# Patient Record
Sex: FEMALE | Race: WHITE | NOT HISPANIC OR LATINO | Employment: FULL TIME | URBAN - NONMETROPOLITAN AREA
[De-identification: names, ages, dates, MRNs, and addresses within clinical notes are randomized per-mention and may not be internally consistent; named-entity substitution may affect disease eponyms.]

---

## 2017-01-13 ENCOUNTER — OFFICE VISIT (OUTPATIENT)
Dept: FAMILY MEDICINE CLINIC | Facility: CLINIC | Age: 27
End: 2017-01-13

## 2017-01-13 VITALS
WEIGHT: 156 LBS | TEMPERATURE: 99.3 F | SYSTOLIC BLOOD PRESSURE: 126 MMHG | HEART RATE: 88 BPM | DIASTOLIC BLOOD PRESSURE: 78 MMHG | BODY MASS INDEX: 25.96 KG/M2 | OXYGEN SATURATION: 95 % | RESPIRATION RATE: 16 BRPM

## 2017-01-13 DIAGNOSIS — J06.0 ACUTE LARYNGOPHARYNGITIS: ICD-10-CM

## 2017-01-13 DIAGNOSIS — J06.0 ACUTE LARYNGOPHARYNGITIS: Primary | ICD-10-CM

## 2017-01-13 LAB
EXPIRATION DATE: NORMAL
INTERNAL CONTROL: NORMAL
Lab: NORMAL
S PYO AG THROAT QL: NEGATIVE

## 2017-01-13 PROCEDURE — 87880 STREP A ASSAY W/OPTIC: CPT | Performed by: INTERNAL MEDICINE

## 2017-01-13 PROCEDURE — 99213 OFFICE O/P EST LOW 20 MIN: CPT | Performed by: INTERNAL MEDICINE

## 2017-01-13 RX ORDER — AZITHROMYCIN 250 MG/1
TABLET, FILM COATED ORAL
Qty: 6 TABLET | Refills: 0 | Status: SHIPPED | OUTPATIENT
Start: 2017-01-13 | End: 2017-01-13 | Stop reason: SDUPTHER

## 2017-01-13 RX ORDER — AZITHROMYCIN 250 MG/1
TABLET, FILM COATED ORAL
Qty: 6 TABLET | Refills: 0 | Status: SHIPPED | OUTPATIENT
Start: 2017-01-13 | End: 2017-05-17

## 2017-01-13 NOTE — MR AVS SNAPSHOT
Lindsay Jeanineherminia Francis   1/13/2017 10:00 AM   Office Visit    Dept Phone:  [phone]   Encounter #:  [id number]    Provider:  London Camarena DO   Department:  [facility] PRIMARY CARE                Your Full Care Plan              Where to Get Your Medications      You can get these medications from any pharmacy     Bring a paper prescription for each of these medications     azithromycin 250 MG tablet            Your Updated Medication List          This list is accurate as of: 1/13/17 10:58 AM.  Always use your most recent med list.                azithromycin 250 MG tablet   Commonly known as:  ZITHROMAX Z-DANNY   Take 2 tablets the first day, then 1 tablet daily for 4 days.       ciprofloxacin 500 MG tablet   Commonly known as:  CIPRO   Take 1 tablet by mouth 2 (Two) Times a Day.       vitamin B-12 1000 MCG tablet   Commonly known as:  CYANOCOBALAMIN               We Performed the Following     POC Rapid Strep A       You Were Diagnosed With        Codes Comments    Acute laryngopharyngitis    -  Primary ICD-10-CM: J06.0  ICD-9-CM: 465.0       Instructions    Pharyngitis  Pharyngitis is redness, pain, and swelling (inflammation) of your pharynx.   CAUSES   Pharyngitis is usually caused by infection. Most of the time, these infections are from viruses (viral) and are part of a cold. However, sometimes pharyngitis is caused by bacteria (bacterial). Pharyngitis can also be caused by allergies. Viral pharyngitis may be spread from person to person by coughing, sneezing, and personal items or utensils (cups, forks, spoons, toothbrushes). Bacterial pharyngitis may be spread from person to person by more intimate contact, such as kissing.   SIGNS AND SYMPTOMS   Symptoms of pharyngitis include:    · Sore throat.    · Tiredness (fatigue).    · Low-grade fever.    · Headache.  · Joint pain and muscle aches.  · Skin rashes.  · Swollen lymph nodes.  · Plaque-like film on throat  or tonsils (often seen with bacterial pharyngitis).  DIAGNOSIS   Your health care provider will ask you questions about your illness and your symptoms. Your medical history, along with a physical exam, is often all that is needed to diagnose pharyngitis. Sometimes, a rapid strep test is done. Other lab tests may also be done, depending on the suspected cause.   TREATMENT   Viral pharyngitis will usually get better in 3-4 days without the use of medicine. Bacterial pharyngitis is treated with medicines that kill germs (antibiotics).   HOME CARE INSTRUCTIONS   · Drink enough water and fluids to keep your urine clear or pale yellow.    · Only take over-the-counter or prescription medicines as directed by your health care provider:      If you are prescribed antibiotics, make sure you finish them even if you start to feel better.      Do not take aspirin.    · Get lots of rest.    · Gargle with 8 oz of salt water (½ tsp of salt per 1 qt of water) as often as every 1-2 hours to soothe your throat.    · Throat lozenges (if you are not at risk for choking) or sprays may be used to soothe your throat.  SEEK MEDICAL CARE IF:   · You have large, tender lumps in your neck.  · You have a rash.  · You cough up green, yellow-brown, or bloody spit.  SEEK IMMEDIATE MEDICAL CARE IF:   · Your neck becomes stiff.  · You drool or are unable to swallow liquids.  · You vomit or are unable to keep medicines or liquids down.  · You have severe pain that does not go away with the use of recommended medicines.  · You have trouble breathing (not caused by a stuffy nose).  MAKE SURE YOU:   · Understand these instructions.  · Will watch your condition.  · Will get help right away if you are not doing well or get worse.     This information is not intended to replace advice given to you by your health care provider. Make sure you discuss any questions you have with your health care provider.     Document Released: 12/18/2006 Document Revised:  10/08/2014 Document Reviewed: 2014  [employer] Interactive Patient Education © [employer]       Patient Instructions History      Upcoming Appointments     Visit Type Date Time Department    OFFICE VISIT 2017 10:00 AM [facility]ENNIFER      [employer] Signup     [facility] [employer] allows you to send messages to your doctor, view your test results, renew your prescriptions, schedule appointments, and more. To sign up, go to [employer] and click on the Sign Up Now link in the New User? box. Enter your [employer] Activation Code exactly as it appears below along with the last four digits of your Social Security Number and your Date of Birth () to complete the sign-up process. If you do not sign up before the expiration date, you must request a new code.    [employer] Activation Code: [email or web address]  Expires: 2017 10:57 AM    If you have questions, you can email [email or web address] or call [phone] to talk to our [employer] staff. Remember, [employer] is NOT to be used for urgent needs. For medical emergencies, dial 911.               Other Info from Your Visit           Allergies     No Known Allergies      Reason for Visit     Sore Throat     Fever     Fatigue           Vital Signs     Blood Pressure Pulse Temperature Respirations Weight Oxygen Saturation    126/78 88 99.3 °F (37.4 °C) (Oral) 16 156 lb (70.8 kg) 95%    Body Mass Index Smoking Status                25.96 kg/m2 Never Smoker          Problems and Diagnoses Noted     Acute laryngopharyngitis    -  Primary      Results     POC Rapid Strep A      Component Value Standard Range & Units    Rapid Strep A Screen Negative Negative, VALID, INVALID, Not Performed    Internal Control Passed Passed    Lot Number [id number]     Expiration Date 2018

## 2017-01-13 NOTE — PATIENT INSTRUCTIONS

## 2017-01-14 NOTE — PROGRESS NOTES
Chief Complaint   Patient presents with   • Sore Throat   • Fever   • Fatigue       Subjective     History of Present Illness   Lindsay Francis is a 26 y.o. female presenting with 3 days progressively worsening URI symptoms including sore throat, fever, fatigue, and cough.  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  She is concerned as symptoms are progressively worsening.    The following portions of the patient's history were reviewed and updated as appropriate: allergies, current medications, past family history, past medical history, past social history, past surgical history and problem list.    Review of Systems   Constitutional: Negative for chills, fatigue and fever.   HENT: Positive for congestion, rhinorrhea and sinus pressure. Negative for ear pain and sore throat.    Eyes: Negative for visual disturbance.   Respiratory: Positive for cough. Negative for chest tightness, shortness of breath and wheezing.    Cardiovascular: Negative for chest pain, palpitations and leg swelling.   Gastrointestinal: Negative for abdominal pain, blood in stool, constipation, diarrhea, nausea and vomiting.   Endocrine: Negative for polydipsia and polyuria.   Genitourinary: Negative for dysuria and hematuria.   Musculoskeletal: Negative for back pain.   Skin: Negative for rash.   Neurological: Negative for dizziness, light-headedness, numbness and headaches.   Psychiatric/Behavioral: Negative for dysphoric mood and sleep disturbance. The patient is not nervous/anxious.        No Known Allergies    Past Medical History   Diagnosis Date   • Finger fracture    • GERD (gastroesophageal reflux disease)    • Kidney infection        Social History     Social History   • Marital status: Legally      Spouse name: N/A   • Number of children: N/A   • Years of education: N/A     Occupational History   •  [employer]     Social History Main Topics   • Smoking status: Never Smoker   • Smokeless tobacco: Not on  file   • Alcohol use No   • Drug use: No   • Sexual activity: Not on file     Other Topics Concern   • Not on file     Social History Narrative       No past surgical history on file.    Family History   Problem Relation Age of Onset   • Arthritis Mother    • Arthritis Father    • Arthritis Maternal Grandmother          Current Outpatient Prescriptions:   •  vitamin B-12 (CYANOCOBALAMIN) 1000 MCG tablet, Take 1,000 mcg by mouth daily., Disp: , Rfl:   •  azithromycin (ZITHROMAX Z-DANNY) 250 MG tablet, Take 2 tablets the first day, then 1 tablet daily for 4 days., Disp: 6 tablet, Rfl: 0  •  ciprofloxacin (CIPRO) 500 MG tablet, Take 1 tablet by mouth 2 (Two) Times a Day., Disp: 20 tablet, Rfl: 0    Objective   Visit Vitals   • /78   • Pulse 88   • Temp 99.3 °F (37.4 °C) (Oral)   • Resp 16   • Wt 156 lb (70.8 kg)   • SpO2 95%   • BMI 25.96 kg/m2       Physical Exam   Constitutional: She is oriented to person, place, and time. She appears well-developed and well-nourished.   HENT:   Head: Normocephalic and atraumatic.   Right Ear: External ear normal.   Left Ear: External ear normal.   Mild throat erythema   Eyes: Conjunctivae are normal.   Pulmonary/Chest: Effort normal.   Musculoskeletal: Normal range of motion.   Neurological: She is alert and oriented to person, place, and time.   Psychiatric: She has a normal mood and affect. Her behavior is normal.   Nursing note and vitals reviewed.      Assessment/Plan   Lindsay was seen today for sore throat, fever and fatigue.    Diagnoses and all orders for this visit:    Acute laryngopharyngitis  -     POC Rapid Strep A  -     Discontinue: azithromycin (ZITHROMAX Z-DANNY) 250 MG tablet; Take 2 tablets the first day, then 1 tablet daily for 4 days.  -     Discontinue: azithromycin (ZITHROMAX Z-DANNY) 250 MG tablet; Take 2 tablets the first day, then 1 tablet daily for 4 days.      Discussion Summary:  25 yo W F presenting with laryngopharyngitis, started tx with  Zpak.      Follow up:  Return if symptoms worsen or fail to improve.     Patient Instructions:  Patient instructions were provided.

## 2017-05-17 ENCOUNTER — OFFICE VISIT (OUTPATIENT)
Dept: FAMILY MEDICINE CLINIC | Facility: CLINIC | Age: 27
End: 2017-05-17

## 2017-05-17 ENCOUNTER — APPOINTMENT (OUTPATIENT)
Dept: LAB | Facility: HOSPITAL | Age: 27
End: 2017-05-17

## 2017-05-17 VITALS
BODY MASS INDEX: 27.79 KG/M2 | WEIGHT: 167 LBS | SYSTOLIC BLOOD PRESSURE: 124 MMHG | HEART RATE: 88 BPM | DIASTOLIC BLOOD PRESSURE: 83 MMHG | OXYGEN SATURATION: 100 % | RESPIRATION RATE: 16 BRPM | TEMPERATURE: 98.4 F

## 2017-05-17 DIAGNOSIS — L04.9 LYMPHADENITIS, ACUTE: ICD-10-CM

## 2017-05-17 DIAGNOSIS — E53.8 VITAMIN B12 DEFICIENCY: ICD-10-CM

## 2017-05-17 DIAGNOSIS — R53.83 MALAISE AND FATIGUE: Primary | ICD-10-CM

## 2017-05-17 DIAGNOSIS — R53.81 MALAISE AND FATIGUE: Primary | ICD-10-CM

## 2017-05-17 DIAGNOSIS — E55.9 VITAMIN D DEFICIENCY: ICD-10-CM

## 2017-05-17 DIAGNOSIS — R63.5 WEIGHT GAIN: ICD-10-CM

## 2017-05-17 LAB
25(OH)D3 SERPL-MCNC: 37.5 NG/ML
ALBUMIN SERPL-MCNC: 4.6 G/DL (ref 3.5–5)
ALBUMIN/GLOB SERPL: 1.5 G/DL (ref 1–2)
ALP SERPL-CCNC: 57 U/L (ref 38–126)
ALT SERPL W P-5'-P-CCNC: 29 U/L (ref 13–69)
ANION GAP SERPL CALCULATED.3IONS-SCNC: 12.9 MMOL/L
AST SERPL-CCNC: 25 U/L (ref 15–46)
BASOPHILS # BLD AUTO: 0.02 10*3/MM3 (ref 0–0.2)
BASOPHILS NFR BLD AUTO: 0.3 % (ref 0–2.5)
BILIRUB SERPL-MCNC: 0.4 MG/DL (ref 0.2–1.3)
BUN BLD-MCNC: 10 MG/DL (ref 7–20)
BUN/CREAT SERPL: 16.7 (ref 7.1–23.5)
CALCIUM SPEC-SCNC: 9.4 MG/DL (ref 8.4–10.2)
CHLORIDE SERPL-SCNC: 104 MMOL/L (ref 98–107)
CO2 SERPL-SCNC: 28 MMOL/L (ref 26–30)
CREAT BLD-MCNC: 0.6 MG/DL (ref 0.6–1.3)
DEPRECATED RDW RBC AUTO: 39.5 FL (ref 37–54)
EOSINOPHIL # BLD AUTO: 0.11 10*3/MM3 (ref 0–0.7)
EOSINOPHIL NFR BLD AUTO: 1.8 % (ref 0–7)
ERYTHROCYTE [DISTWIDTH] IN BLOOD BY AUTOMATED COUNT: 12 % (ref 11.5–14.5)
GFR SERPL CREATININE-BSD FRML MDRD: 122 ML/MIN/1.73
GLOBULIN UR ELPH-MCNC: 3 GM/DL
GLUCOSE BLD-MCNC: 92 MG/DL (ref 74–98)
HBA1C MFR BLD: 5 % (ref 3–6)
HCT VFR BLD AUTO: 40.7 % (ref 37–47)
HGB BLD-MCNC: 13.6 G/DL (ref 12–16)
IMM GRANULOCYTES # BLD: 0.02 10*3/MM3 (ref 0–0.06)
IMM GRANULOCYTES NFR BLD: 0.3 % (ref 0–0.6)
LYMPHOCYTES # BLD AUTO: 1.33 10*3/MM3 (ref 0.6–3.4)
LYMPHOCYTES NFR BLD AUTO: 21.6 % (ref 10–50)
MCH RBC QN AUTO: 30 PG (ref 27–31)
MCHC RBC AUTO-ENTMCNC: 33.4 G/DL (ref 30–37)
MCV RBC AUTO: 89.8 FL (ref 81–99)
MONOCYTES # BLD AUTO: 0.42 10*3/MM3 (ref 0–0.9)
MONOCYTES NFR BLD AUTO: 6.8 % (ref 0–12)
NEUTROPHILS # BLD AUTO: 4.26 10*3/MM3 (ref 2–6.9)
NEUTROPHILS NFR BLD AUTO: 69.2 % (ref 37–80)
NRBC BLD MANUAL-RTO: 0 /100 WBC (ref 0–0)
PLATELET # BLD AUTO: 323 10*3/MM3 (ref 130–400)
PMV BLD AUTO: 9.9 FL (ref 6–12)
POTASSIUM BLD-SCNC: 3.9 MMOL/L (ref 3.5–5.1)
PROT SERPL-MCNC: 7.6 G/DL (ref 6.3–8.2)
RBC # BLD AUTO: 4.53 10*6/MM3 (ref 4.2–5.4)
SODIUM BLD-SCNC: 141 MMOL/L (ref 137–145)
TSH SERPL DL<=0.05 MIU/L-ACNC: 1.47 MIU/ML (ref 0.47–4.68)
VIT B12 BLD-MCNC: 438 PG/ML (ref 239–931)
WBC NRBC COR # BLD: 6.16 10*3/MM3 (ref 4.8–10.8)

## 2017-05-17 PROCEDURE — 84443 ASSAY THYROID STIM HORMONE: CPT | Performed by: INTERNAL MEDICINE

## 2017-05-17 PROCEDURE — 83036 HEMOGLOBIN GLYCOSYLATED A1C: CPT | Performed by: INTERNAL MEDICINE

## 2017-05-17 PROCEDURE — 80053 COMPREHEN METABOLIC PANEL: CPT | Performed by: INTERNAL MEDICINE

## 2017-05-17 PROCEDURE — 82607 VITAMIN B-12: CPT | Performed by: INTERNAL MEDICINE

## 2017-05-17 PROCEDURE — 82306 VITAMIN D 25 HYDROXY: CPT | Performed by: INTERNAL MEDICINE

## 2017-05-17 PROCEDURE — 85025 COMPLETE CBC W/AUTO DIFF WBC: CPT | Performed by: INTERNAL MEDICINE

## 2017-05-17 PROCEDURE — 36415 COLL VENOUS BLD VENIPUNCTURE: CPT | Performed by: INTERNAL MEDICINE

## 2017-05-17 PROCEDURE — 99213 OFFICE O/P EST LOW 20 MIN: CPT | Performed by: INTERNAL MEDICINE

## 2017-05-17 RX ORDER — CEPHALEXIN 500 MG/1
500 CAPSULE ORAL 2 TIMES DAILY
Qty: 20 CAPSULE | Refills: 0 | Status: SHIPPED | OUTPATIENT
Start: 2017-05-17 | End: 2017-05-27

## 2017-08-02 RX ORDER — SULFAMETHOXAZOLE AND TRIMETHOPRIM 800; 160 MG/1; MG/1
1 TABLET ORAL 2 TIMES DAILY
Qty: 20 TABLET | Refills: 0 | Status: SHIPPED | OUTPATIENT
Start: 2017-08-02 | End: 2017-08-12

## 2017-08-02 RX ORDER — FLUCONAZOLE 150 MG/1
TABLET ORAL
Qty: 2 TABLET | Refills: 0 | Status: SHIPPED | OUTPATIENT
Start: 2017-08-02 | End: 2018-12-28

## 2017-09-15 RX ORDER — NEOMYCIN SULFATE, POLYMYXIN B SULFATE AND HYDROCORTISONE 10; 3.5; 1 MG/ML; MG/ML; [USP'U]/ML
4 SUSPENSION/ DROPS AURICULAR (OTIC) 2 TIMES DAILY
Qty: 10 ML | Refills: 0 | Status: SHIPPED | OUTPATIENT
Start: 2017-09-15 | End: 2018-12-28

## 2017-12-21 DIAGNOSIS — J01.90 ACUTE NON-RECURRENT SINUSITIS, UNSPECIFIED LOCATION: Primary | ICD-10-CM

## 2017-12-21 RX ORDER — AZITHROMYCIN 250 MG/1
TABLET, FILM COATED ORAL
Qty: 6 TABLET | Refills: 0 | Status: SHIPPED | OUTPATIENT
Start: 2017-12-21 | End: 2018-12-28

## 2018-05-08 ENCOUNTER — HOSPITAL ENCOUNTER (EMERGENCY)
Facility: HOSPITAL | Age: 28
Discharge: HOME | End: 2018-05-08
Attending: EMERGENCY MEDICINE | Admitting: EMERGENCY MEDICINE

## 2018-05-08 ENCOUNTER — APPOINTMENT (OUTPATIENT)
Dept: GENERAL RADIOLOGY | Facility: HOSPITAL | Age: 28
End: 2018-05-08

## 2018-05-08 VITALS
OXYGEN SATURATION: 99 % | RESPIRATION RATE: 16 BRPM | DIASTOLIC BLOOD PRESSURE: 72 MMHG | TEMPERATURE: 98.1 F | HEIGHT: 65 IN | BODY MASS INDEX: 30.02 KG/M2 | SYSTOLIC BLOOD PRESSURE: 119 MMHG | HEART RATE: 85 BPM | WEIGHT: 180.2 LBS

## 2018-05-08 DIAGNOSIS — S93.402A SPRAIN OF LEFT ANKLE, UNSPECIFIED LIGAMENT, INITIAL ENCOUNTER: Primary | ICD-10-CM

## 2018-05-08 PROCEDURE — 73610 X-RAY EXAM OF ANKLE: CPT

## 2018-05-08 PROCEDURE — 73630 X-RAY EXAM OF FOOT: CPT

## 2018-05-08 PROCEDURE — 99284 EMERGENCY DEPT VISIT MOD MDM: CPT

## 2018-05-08 RX ORDER — ACETAMINOPHEN 325 MG/1
650 TABLET ORAL ONCE
Status: COMPLETED | OUTPATIENT
Start: 2018-05-08 | End: 2018-05-08

## 2018-05-08 RX ORDER — ACETAMINOPHEN 500 MG
500 TABLET ORAL EVERY 6 HOURS PRN
Qty: 15 TABLET | Refills: 0 | Status: SHIPPED | OUTPATIENT
Start: 2018-05-08 | End: 2018-12-28

## 2018-05-08 RX ORDER — IBUPROFEN 600 MG/1
600 TABLET ORAL EVERY 8 HOURS PRN
Qty: 12 TABLET | Refills: 0 | Status: SHIPPED | OUTPATIENT
Start: 2018-05-08 | End: 2018-12-28

## 2018-05-08 RX ORDER — IBUPROFEN 600 MG/1
600 TABLET ORAL ONCE
Status: COMPLETED | OUTPATIENT
Start: 2018-05-08 | End: 2018-05-08

## 2018-05-08 RX ADMIN — IBUPROFEN 600 MG: 600 TABLET ORAL at 20:42

## 2018-05-08 RX ADMIN — ACETAMINOPHEN 650 MG: 325 TABLET, FILM COATED ORAL at 20:42

## 2018-05-09 NOTE — ED PROVIDER NOTES
Subjective   Patient is here with complaint of trip injury twisted her left ankle earlier today inside her house,.. No other injuries reported pain to the left ankle and foot patient adamantly denies pregnancy no numbness tingling reported presents here for further evaluation        History provided by:  Patient and spouse      Review of Systems   Constitutional: Negative.    HENT: Negative.    Respiratory: Negative.    Cardiovascular: Negative.    Gastrointestinal: Negative.    Musculoskeletal: Positive for arthralgias.   Skin: Negative.    Neurological: Negative.    Psychiatric/Behavioral: Negative.    All other systems reviewed and are negative.      Past Medical History:   Diagnosis Date   • Finger fracture    • GERD (gastroesophageal reflux disease)    • Kidney infection        No Known Allergies    History reviewed. No pertinent surgical history.    Family History   Problem Relation Age of Onset   • Arthritis Mother    • Arthritis Father    • Arthritis Maternal Grandmother        Social History     Social History   • Marital status: Legally      Occupational History   •  [employer]     Social History Main Topics   • Smoking status: Never Smoker   • Alcohol use No   • Drug use: No     Other Topics Concern   • Not on file           Objective   Physical Exam   Constitutional: She is oriented to person, place, and time. She appears well-developed and well-nourished.   Afebrile nontoxic no acute distress   HENT:   Head: Normocephalic and atraumatic.   Eyes: Pupils are equal, round, and reactive to light.   Cardiovascular: Normal rate and intact distal pulses.    Pulmonary/Chest: Effort normal.   Musculoskeletal: She exhibits edema and tenderness. She exhibits no deformity.   Tenderness lateral malleolus left ankle and dorsal lateral aspect left foot no deformity   Neurological: She is alert and oriented to person, place, and time. No cranial nerve deficit. She exhibits normal muscle tone. Coordination  normal.   Skin: Skin is warm and dry. Capillary refill takes less than 2 seconds. No rash noted. No erythema. No pallor.   Psychiatric: She has a normal mood and affect. Her behavior is normal. Judgment and thought content normal.   Nursing note and vitals reviewed.      Splint - Cast - Strapping  Date/Time: 5/8/2018 9:11 PM  Performed by: PAKO PENN  Authorized by: DANY LOMELI     Consent:     Consent obtained:  Verbal    Consent given by:  Patient  Pre-procedure details:     Sensation:  Normal    Skin color:  Pink  Procedure details:     Laterality:  Left    Location:  Ankle    Ankle:  L ankle    Strapping: no      Splint type:  CAM walker boot  Post-procedure details:     Pain:  Improved    Sensation:  Normal    Skin color:  Pink    Patient tolerance of procedure:  Tolerated well, no immediate complications               ED Course  ED Course   Comment By Time   Patient states she does have crutches at home we'll provide work excuse.. And cast boot.. Recommend follow-up with orthopedics Pako Penn PA-C 05/08 2101                  [facility]      Final diagnoses:   Sprain of left ankle, unspecified ligament, initial encounter            Pako Penn PA-C  05/08/18 2122       Pako Penn PA-C  05/08/18 2128       Pako Penn PA-C  05/08/18 2131

## 2018-12-28 ENCOUNTER — OFFICE VISIT (OUTPATIENT)
Dept: INTERNAL MEDICINE | Facility: CLINIC | Age: 28
End: 2018-12-28

## 2018-12-28 VITALS
TEMPERATURE: 101.9 F | HEIGHT: 65 IN | WEIGHT: 173 LBS | OXYGEN SATURATION: 98 % | BODY MASS INDEX: 28.82 KG/M2 | SYSTOLIC BLOOD PRESSURE: 106 MMHG | DIASTOLIC BLOOD PRESSURE: 78 MMHG | HEART RATE: 88 BPM

## 2018-12-28 DIAGNOSIS — N39.0 URINARY TRACT INFECTION WITH HEMATURIA, SITE UNSPECIFIED: Primary | ICD-10-CM

## 2018-12-28 DIAGNOSIS — R11.2 NAUSEA AND VOMITING, INTRACTABILITY OF VOMITING NOT SPECIFIED, UNSPECIFIED VOMITING TYPE: ICD-10-CM

## 2018-12-28 DIAGNOSIS — R31.9 URINARY TRACT INFECTION WITH HEMATURIA, SITE UNSPECIFIED: Primary | ICD-10-CM

## 2018-12-28 DIAGNOSIS — R05.9 COUGH: ICD-10-CM

## 2018-12-28 PROBLEM — R50.9 FEVER: Status: ACTIVE | Noted: 2018-12-28

## 2018-12-28 LAB
BILIRUB BLD-MCNC: ABNORMAL MG/DL
CLARITY, POC: ABNORMAL
COLOR UR: YELLOW
EXPIRATION DATE: NORMAL
FLUAV AG NPH QL: NEGATIVE
FLUBV AG NPH QL: NEGATIVE
GLUCOSE UR STRIP-MCNC: NEGATIVE MG/DL
INTERNAL CONTROL: NORMAL
KETONES UR QL: ABNORMAL
LEUKOCYTE EST, POC: ABNORMAL
Lab: NORMAL
NITRITE UR-MCNC: POSITIVE MG/ML
PH UR: 6 [PH] (ref 5–8)
PROT UR STRIP-MCNC: ABNORMAL MG/DL
RBC # UR STRIP: ABNORMAL /UL
SP GR UR: 1.01 (ref 1–1.03)
UROBILINOGEN UR QL: ABNORMAL

## 2018-12-28 PROCEDURE — 87804 INFLUENZA ASSAY W/OPTIC: CPT | Performed by: PHYSICIAN ASSISTANT

## 2018-12-28 PROCEDURE — 99213 OFFICE O/P EST LOW 20 MIN: CPT | Performed by: PHYSICIAN ASSISTANT

## 2018-12-28 PROCEDURE — 81003 URINALYSIS AUTO W/O SCOPE: CPT | Performed by: PHYSICIAN ASSISTANT

## 2018-12-28 RX ORDER — PROMETHAZINE HYDROCHLORIDE 25 MG/1
TABLET ORAL
Qty: 20 TABLET | Refills: 0 | Status: SHIPPED | OUTPATIENT
Start: 2018-12-28 | End: 2019-03-21

## 2018-12-28 RX ORDER — CEFUROXIME AXETIL 500 MG/1
500 TABLET ORAL 2 TIMES DAILY
Qty: 14 TABLET | Refills: 0 | OUTPATIENT
Start: 2018-12-28 | End: 2018-12-30

## 2018-12-28 RX ORDER — FLUCONAZOLE 150 MG/1
150 TABLET ORAL ONCE
Qty: 1 TABLET | Refills: 0 | Status: SHIPPED | OUTPATIENT
Start: 2018-12-28 | End: 2018-12-29

## 2018-12-28 NOTE — PROGRESS NOTES
Lindsay Francis is a 28 y.o. female.     Subjective   History of Present Illness   Here today with concern of around 3 days of fever, chills, body aches, cough, nausea, vomiting, headache and ear fullness.  She has had some pain in the left ribs when she coughs. No diarrhea, SOA, nasal congestion, postnasal drip, ear pain or sore throat. She also feels like she has a UTI due to urine odor, urinary frequency and darker than normal appearance for the last 5 days or so. No low back pain or hematuria.  She has had decreased appetite.         The following portions of the patient's history were reviewed and updated as appropriate: allergies, current medications, past family history, past medical history, past social history, past surgical history and problem list.    Review of Systems    Constitutional: chills, fatigue, fever, decreased appetite. Negative for unexpected weight change.   HENT: ear fullness/pressure. Negative for congestion, ear pain, hearing loss, nosebleeds, postnasal drip, rhinorrhea, sore throat, tinnitus and trouble swallowing.    Eyes: Negative for photophobia, discharge and visual disturbance.   Respiratory: cough.  Negative for chest tightness, shortness of breath and wheezing.    Cardiovascular: Negative for chest pain, palpitations and leg swelling.   Gastrointestinal:  nausea and vomiting. Negative for abdominal distention, abdominal pain, blood in stool, constipation, diarrhea.  Endocrine: Negative for cold intolerance, heat intolerance, polydipsia, polyphagia and polyuria.   Musculoskeletal: left anterior rib pain. Body aches. Negative for back pain, joint swelling, myalgias, neck pain and neck stiffness.   Skin: Negative for color change, pallor, rash and wound.   Allergic/Immunologic: Negative for environmental allergies, food allergies and immunocompromised state.   Neurological: headaches.  Negative for dizziness, tremors, seizures, weakness, numbness.  Hematological: Negative for  "adenopathy. Does not bruise/bleed easily.   Psychiatric/Behavioral: Negative for sleep disturbances, agitation, behavioral problems, confusion, hallucinations, self-injury and suicidal ideas. The patient is not nervous/anxious.    : frequency, dark urine, urine odor. No hematuria.     Objective    Physical Exam  Constitutional: Appears well-developed and well-nourished.   HENT: OP normal. Very-mild bilateral TM effusions.   Head: no sinus tenderness. Normocephalic and atraumatic.   Eyes: EOM are normal. Pupils are equal, round, and reactive to light.   Neck: Normal range of motion. Neck supple.   Cardiovascular: Normal rate, regular rhythm and normal heart sounds.    Pulmonary/Chest: Effort normal and breath sounds normal. No respiratory distress.  Has no wheezes or rales. Exhibits no chest wall tenderness.   Abdominal: Soft. Bowel sounds are normal. Exhibits no distension and no mass. There is no tenderness.   Musculoskeletal: mild left costovertebral angle and left anterior lower rib tenderness. Normal range of motion.   Neurological: Alert and oriented to person, place, and time.   Skin: Skin is warm and dry.   Psychiatric: Has a normal mood and affect. Behavior is normal. Judgment and thought content normal.       /78   Pulse 88   Temp (!) 101.9 °F (38.8 °C)   Ht 165.1 cm (65\")   Wt 78.5 kg (173 lb)   SpO2 98%   BMI 28.79 kg/m²     Nursing note and vitals reviewed.        Assessment/Plan   Lindsay was seen today for nausea, cough and ear problem.    Diagnoses and all orders for this visit:    Urinary tract infection with hematuria, site unspecified  -     POCT Influenza A/B-negative  -     POCT urinalysis dipstick, automated  Brief Urine Lab Results  (Last result in the past 365 days)      Color   Clarity   Blood   Leuk Est   Nitrite   Protein   CREAT   Urine HCG        12/28/18 1522 Yellow Cloudy 250 Cleve/ul 500 Danilo/ul Positive 500 mg/dL           -     Urine Culture - Urine, Urine, Clean Catch  -   "   Begin- cefuroxime (CEFTIN) 500 MG tablet; Take 1 tablet by mouth 2 (Two) Times a Day for 7 days.    Cough  Ceftin    Nausea and vomiting, intractability of vomiting not specified, unspecified vomiting type  -     promethazine (PHENERGAN) 25 MG tablet; Take 1/2 to 1 tablet by mouth every 6 hours as needed for nausea.      Increase PO fluid intake. Ibuprofen and/or Tylenol prn for pain and fever control.       Call or RTC if symptoms worsen or persist.

## 2018-12-30 ENCOUNTER — HOSPITAL ENCOUNTER (EMERGENCY)
Facility: HOSPITAL | Age: 28
Discharge: HOME | End: 2018-12-30
Attending: EMERGENCY MEDICINE | Admitting: EMERGENCY MEDICINE

## 2018-12-30 VITALS
BODY MASS INDEX: 28.32 KG/M2 | SYSTOLIC BLOOD PRESSURE: 132 MMHG | WEIGHT: 170 LBS | DIASTOLIC BLOOD PRESSURE: 78 MMHG | RESPIRATION RATE: 18 BRPM | HEART RATE: 93 BPM | TEMPERATURE: 99.5 F | HEIGHT: 65 IN | OXYGEN SATURATION: 100 %

## 2018-12-30 DIAGNOSIS — N30.00 ACUTE CYSTITIS WITHOUT HEMATURIA: Primary | ICD-10-CM

## 2018-12-30 LAB
ALBUMIN SERPL-MCNC: 4.4 G/DL (ref 3.5–5)
ALBUMIN/GLOB SERPL: 1.5 G/DL (ref 1–2)
ALP SERPL-CCNC: 68 U/L (ref 38–126)
ALT SERPL W P-5'-P-CCNC: 19 U/L (ref 13–69)
ANION GAP SERPL CALCULATED.3IONS-SCNC: 13.2 MMOL/L (ref 10–20)
AST SERPL-CCNC: 16 U/L (ref 15–46)
B-HCG UR QL: NEGATIVE
BACTERIA UR QL AUTO: ABNORMAL /HPF
BASOPHILS # BLD AUTO: 0.01 10*3/MM3 (ref 0–0.2)
BASOPHILS NFR BLD AUTO: 0.2 % (ref 0–2.5)
BILIRUB SERPL-MCNC: 0.5 MG/DL (ref 0.2–1.3)
BILIRUB UR QL STRIP: ABNORMAL
BUN BLD-MCNC: 9 MG/DL (ref 7–20)
BUN/CREAT SERPL: 15 (ref 7.1–23.5)
CALCIUM SPEC-SCNC: 8.8 MG/DL (ref 8.4–10.2)
CHLORIDE SERPL-SCNC: 98 MMOL/L (ref 98–107)
CLARITY UR: ABNORMAL
CO2 SERPL-SCNC: 28 MMOL/L (ref 26–30)
COLOR UR: YELLOW
CREAT BLD-MCNC: 0.6 MG/DL (ref 0.6–1.3)
DEPRECATED RDW RBC AUTO: 38 FL (ref 37–54)
EOSINOPHIL # BLD AUTO: 0.03 10*3/MM3 (ref 0–0.7)
EOSINOPHIL NFR BLD AUTO: 0.6 % (ref 0–7)
ERYTHROCYTE [DISTWIDTH] IN BLOOD BY AUTOMATED COUNT: 11.8 % (ref 11.5–14.5)
GFR SERPL CREATININE-BSD FRML MDRD: 121 ML/MIN/1.73
GLOBULIN UR ELPH-MCNC: 2.9 GM/DL
GLUCOSE BLD-MCNC: 105 MG/DL (ref 74–98)
GLUCOSE UR STRIP-MCNC: NEGATIVE MG/DL
HCT VFR BLD AUTO: 37.9 % (ref 37–47)
HGB BLD-MCNC: 12.3 G/DL (ref 12–16)
HGB UR QL STRIP.AUTO: ABNORMAL
HYALINE CASTS UR QL AUTO: ABNORMAL /LPF
IMM GRANULOCYTES # BLD AUTO: 0.02 10*3/MM3 (ref 0–0.06)
IMM GRANULOCYTES NFR BLD AUTO: 0.4 % (ref 0–0.6)
KETONES UR QL STRIP: ABNORMAL
LEUKOCYTE ESTERASE UR QL STRIP.AUTO: ABNORMAL
LYMPHOCYTES # BLD AUTO: 0.68 10*3/MM3 (ref 0.6–3.4)
LYMPHOCYTES NFR BLD AUTO: 13.3 % (ref 10–50)
MCH RBC QN AUTO: 28.8 PG (ref 27–31)
MCHC RBC AUTO-ENTMCNC: 32.5 G/DL (ref 30–37)
MCV RBC AUTO: 88.8 FL (ref 81–99)
MONOCYTES # BLD AUTO: 0.65 10*3/MM3 (ref 0–0.9)
MONOCYTES NFR BLD AUTO: 12.7 % (ref 0–12)
MUCOUS THREADS URNS QL MICRO: ABNORMAL /HPF
NEUTROPHILS # BLD AUTO: 3.72 10*3/MM3 (ref 2–6.9)
NEUTROPHILS NFR BLD AUTO: 72.8 % (ref 37–80)
NITRITE UR QL STRIP: NEGATIVE
NRBC BLD AUTO-RTO: 0 /100 WBC (ref 0–0)
PH UR STRIP.AUTO: 6.5 [PH] (ref 5–8)
PLATELET # BLD AUTO: 314 10*3/MM3 (ref 130–400)
PMV BLD AUTO: 9.1 FL (ref 6–12)
POTASSIUM BLD-SCNC: 3.2 MMOL/L (ref 3.5–5.1)
PROT SERPL-MCNC: 7.3 G/DL (ref 6.3–8.2)
PROT UR QL STRIP: ABNORMAL
RBC # BLD AUTO: 4.27 10*6/MM3 (ref 4.2–5.4)
RBC # UR: ABNORMAL /HPF
REF LAB TEST METHOD: ABNORMAL
SODIUM BLD-SCNC: 136 MMOL/L (ref 137–145)
SP GR UR STRIP: 1.01 (ref 1–1.03)
SQUAMOUS #/AREA URNS HPF: ABNORMAL /HPF
UROBILINOGEN UR QL STRIP: ABNORMAL
WBC NRBC COR # BLD: 5.11 10*3/MM3 (ref 4.8–10.8)
WBC UR QL AUTO: ABNORMAL /HPF

## 2018-12-30 PROCEDURE — 80053 COMPREHEN METABOLIC PANEL: CPT | Performed by: PHYSICIAN ASSISTANT

## 2018-12-30 PROCEDURE — 81001 URINALYSIS AUTO W/SCOPE: CPT | Performed by: PHYSICIAN ASSISTANT

## 2018-12-30 PROCEDURE — 85025 COMPLETE CBC W/AUTO DIFF WBC: CPT | Performed by: PHYSICIAN ASSISTANT

## 2018-12-30 PROCEDURE — 81025 URINE PREGNANCY TEST: CPT | Performed by: PHYSICIAN ASSISTANT

## 2018-12-30 PROCEDURE — 99283 EMERGENCY DEPT VISIT LOW MDM: CPT

## 2018-12-30 RX ORDER — ACETAMINOPHEN 500 MG
1000 TABLET ORAL ONCE
Status: COMPLETED | OUTPATIENT
Start: 2018-12-30 | End: 2018-12-30

## 2018-12-30 RX ORDER — SODIUM CHLORIDE 0.9 % (FLUSH) 0.9 %
10 SYRINGE (ML) INJECTION AS NEEDED
Status: DISCONTINUED | OUTPATIENT
Start: 2018-12-30 | End: 2018-12-30 | Stop reason: HOSPADM

## 2018-12-30 RX ORDER — NITROFURANTOIN 25; 75 MG/1; MG/1
100 CAPSULE ORAL 2 TIMES DAILY
Qty: 14 CAPSULE | Refills: 0 | Status: SHIPPED | OUTPATIENT
Start: 2018-12-30 | End: 2019-01-06

## 2018-12-30 RX ADMIN — ACETAMINOPHEN 1000 MG: 500 TABLET, FILM COATED ORAL at 16:29

## 2018-12-30 RX ADMIN — SODIUM CHLORIDE 1000 ML: 9 INJECTION, SOLUTION INTRAVENOUS at 15:34

## 2018-12-31 LAB
BACTERIA UR CULT: ABNORMAL
BACTERIA UR CULT: ABNORMAL
OTHER ANTIBIOTIC SUSC ISLT: ABNORMAL

## 2019-01-04 ENCOUNTER — TELEPHONE (OUTPATIENT)
Dept: OTHER | Facility: OTHER | Age: 29
End: 2019-01-04

## 2019-01-04 NOTE — TELEPHONE ENCOUNTER
Patient said she called and spoke to someone yesterday and did not receive a call back, she had been to the ER after seeing Kaley and they changed her antibiotic to something different, but pt got message from lab results that the antibiotic she previously had been given should be working. Please advise on which antibiotic she should be taking  Phone # [phone]

## 2019-01-04 NOTE — TELEPHONE ENCOUNTER
I left message for pt yesterday, then realized she was in ED after we saw her, she was put on a different antibiotic that was sensitive to bacteria in urine, which med does she need to continue to take

## 2019-01-04 NOTE — TELEPHONE ENCOUNTER
Pt said she is feeling better since on new one, she will continue it, told her to call if any changes

## 2019-01-04 NOTE — TELEPHONE ENCOUNTER
Her urine culture is sensitive to both antibiotics so she can continue the new one from the ER if she has been feeling better since starting it.

## 2019-02-22 DIAGNOSIS — N39.0 URINARY TRACT INFECTION WITHOUT HEMATURIA, SITE UNSPECIFIED: Primary | ICD-10-CM

## 2019-02-22 RX ORDER — NITROFURANTOIN 25; 75 MG/1; MG/1
100 CAPSULE ORAL 2 TIMES DAILY
Qty: 14 CAPSULE | Refills: 0 | OUTPATIENT
Start: 2019-02-22 | End: 2019-02-24

## 2019-02-24 ENCOUNTER — APPOINTMENT (OUTPATIENT)
Dept: CT IMAGING | Facility: HOSPITAL | Age: 29
End: 2019-02-24

## 2019-02-24 ENCOUNTER — HOSPITAL ENCOUNTER (EMERGENCY)
Facility: HOSPITAL | Age: 29
Discharge: HOME | End: 2019-02-24
Attending: EMERGENCY MEDICINE | Admitting: EMERGENCY MEDICINE

## 2019-02-24 VITALS
DIASTOLIC BLOOD PRESSURE: 90 MMHG | RESPIRATION RATE: 16 BRPM | HEIGHT: 65 IN | WEIGHT: 167.8 LBS | TEMPERATURE: 99.1 F | SYSTOLIC BLOOD PRESSURE: 130 MMHG | OXYGEN SATURATION: 97 % | BODY MASS INDEX: 27.96 KG/M2 | HEART RATE: 139 BPM

## 2019-02-24 DIAGNOSIS — N12 PYELONEPHRITIS: Primary | ICD-10-CM

## 2019-02-24 DIAGNOSIS — N83.201 CYST OF RIGHT OVARY: ICD-10-CM

## 2019-02-24 LAB
ALBUMIN SERPL-MCNC: 4.1 G/DL (ref 3.5–5)
ALBUMIN/GLOB SERPL: 1.5 G/DL (ref 1–2)
ALP SERPL-CCNC: 50 U/L (ref 38–126)
ALT SERPL W P-5'-P-CCNC: 21 U/L (ref 13–69)
ANION GAP SERPL CALCULATED.3IONS-SCNC: 11.4 MMOL/L (ref 10–20)
AST SERPL-CCNC: 18 U/L (ref 15–46)
B-HCG UR QL: NEGATIVE
BACTERIA UR QL AUTO: ABNORMAL /HPF
BASOPHILS # BLD AUTO: 0.02 10*3/MM3 (ref 0–0.2)
BASOPHILS NFR BLD AUTO: 0.2 % (ref 0–2.5)
BILIRUB SERPL-MCNC: 0.5 MG/DL (ref 0.2–1.3)
BILIRUB UR QL STRIP: NEGATIVE
BUN BLD-MCNC: 6 MG/DL (ref 7–20)
BUN/CREAT SERPL: 10 (ref 7.1–23.5)
CALCIUM SPEC-SCNC: 9 MG/DL (ref 8.4–10.2)
CHLORIDE SERPL-SCNC: 103 MMOL/L (ref 98–107)
CLARITY UR: ABNORMAL
CO2 SERPL-SCNC: 25 MMOL/L (ref 26–30)
COLOR UR: YELLOW
CREAT BLD-MCNC: 0.6 MG/DL (ref 0.6–1.3)
DEPRECATED RDW RBC AUTO: 41.3 FL (ref 37–54)
EOSINOPHIL # BLD AUTO: 0.01 10*3/MM3 (ref 0–0.7)
EOSINOPHIL NFR BLD AUTO: 0.1 % (ref 0–7)
ERYTHROCYTE [DISTWIDTH] IN BLOOD BY AUTOMATED COUNT: 12.4 % (ref 11.5–14.5)
GFR SERPL CREATININE-BSD FRML MDRD: 121 ML/MIN/1.73
GLOBULIN UR ELPH-MCNC: 2.8 GM/DL
GLUCOSE BLD-MCNC: 104 MG/DL (ref 74–98)
GLUCOSE UR STRIP-MCNC: NEGATIVE MG/DL
HCT VFR BLD AUTO: 36.1 % (ref 37–47)
HGB BLD-MCNC: 12 G/DL (ref 12–16)
HGB UR QL STRIP.AUTO: ABNORMAL
HYALINE CASTS UR QL AUTO: ABNORMAL /LPF
IMM GRANULOCYTES # BLD AUTO: 0.02 10*3/MM3 (ref 0–0.06)
IMM GRANULOCYTES NFR BLD AUTO: 0.2 % (ref 0–0.6)
KETONES UR QL STRIP: ABNORMAL
LEUKOCYTE ESTERASE UR QL STRIP.AUTO: ABNORMAL
LIPASE SERPL-CCNC: 52 U/L (ref 23–300)
LYMPHOCYTES # BLD AUTO: 0.66 10*3/MM3 (ref 0.6–3.4)
LYMPHOCYTES NFR BLD AUTO: 8.1 % (ref 10–50)
MCH RBC QN AUTO: 29.8 PG (ref 27–31)
MCHC RBC AUTO-ENTMCNC: 33.2 G/DL (ref 30–37)
MCV RBC AUTO: 89.6 FL (ref 81–99)
MONOCYTES # BLD AUTO: 0.97 10*3/MM3 (ref 0–0.9)
MONOCYTES NFR BLD AUTO: 11.9 % (ref 0–12)
MUCOUS THREADS URNS QL MICRO: ABNORMAL /HPF
NEUTROPHILS # BLD AUTO: 6.44 10*3/MM3 (ref 2–6.9)
NEUTROPHILS NFR BLD AUTO: 79.5 % (ref 37–80)
NITRITE UR QL STRIP: NEGATIVE
NRBC BLD AUTO-RTO: 0 /100 WBC (ref 0–0)
PH UR STRIP.AUTO: 6 [PH] (ref 5–8)
PLATELET # BLD AUTO: 224 10*3/MM3 (ref 130–400)
PMV BLD AUTO: 9.3 FL (ref 6–12)
POTASSIUM BLD-SCNC: 3.4 MMOL/L (ref 3.5–5.1)
PROT SERPL-MCNC: 6.9 G/DL (ref 6.3–8.2)
PROT UR QL STRIP: ABNORMAL
RBC # BLD AUTO: 4.03 10*6/MM3 (ref 4.2–5.4)
RBC # UR: ABNORMAL /HPF
REF LAB TEST METHOD: ABNORMAL
SODIUM BLD-SCNC: 136 MMOL/L (ref 137–145)
SP GR UR STRIP: 1.02 (ref 1–1.03)
SQUAMOUS #/AREA URNS HPF: ABNORMAL /HPF
UROBILINOGEN UR QL STRIP: ABNORMAL
WBC NRBC COR # BLD: 8.12 10*3/MM3 (ref 4.8–10.8)
WBC UR QL AUTO: ABNORMAL /HPF

## 2019-02-24 PROCEDURE — 25010000002 KETOROLAC TROMETHAMINE PER 15 MG: Performed by: EMERGENCY MEDICINE

## 2019-02-24 PROCEDURE — 81025 URINE PREGNANCY TEST: CPT | Performed by: EMERGENCY MEDICINE

## 2019-02-24 PROCEDURE — 83690 ASSAY OF LIPASE: CPT | Performed by: EMERGENCY MEDICINE

## 2019-02-24 PROCEDURE — 96375 TX/PRO/DX INJ NEW DRUG ADDON: CPT

## 2019-02-24 PROCEDURE — 25010000002 ONDANSETRON PER 1 MG: Performed by: EMERGENCY MEDICINE

## 2019-02-24 PROCEDURE — 81001 URINALYSIS AUTO W/SCOPE: CPT | Performed by: EMERGENCY MEDICINE

## 2019-02-24 PROCEDURE — 96365 THER/PROPH/DIAG IV INF INIT: CPT

## 2019-02-24 PROCEDURE — 80053 COMPREHEN METABOLIC PANEL: CPT | Performed by: EMERGENCY MEDICINE

## 2019-02-24 PROCEDURE — 74177 CT ABD & PELVIS W/CONTRAST: CPT

## 2019-02-24 PROCEDURE — 25010000002 IOPAMIDOL 61 % SOLUTION: Performed by: EMERGENCY MEDICINE

## 2019-02-24 PROCEDURE — 25010000002 CEFTRIAXONE SODIUM-DEXTROSE 1-3.74 GM-%(50ML) RECONSTITUTED SOLUTION: Performed by: EMERGENCY MEDICINE

## 2019-02-24 PROCEDURE — 85025 COMPLETE CBC W/AUTO DIFF WBC: CPT | Performed by: EMERGENCY MEDICINE

## 2019-02-24 PROCEDURE — 99283 EMERGENCY DEPT VISIT LOW MDM: CPT

## 2019-02-24 PROCEDURE — 87086 URINE CULTURE/COLONY COUNT: CPT | Performed by: EMERGENCY MEDICINE

## 2019-02-24 RX ORDER — KETOROLAC TROMETHAMINE 30 MG/ML
30 INJECTION, SOLUTION INTRAMUSCULAR; INTRAVENOUS ONCE
Status: COMPLETED | OUTPATIENT
Start: 2019-02-24 | End: 2019-02-24

## 2019-02-24 RX ORDER — CEFTRIAXONE 1 G/50ML
1 INJECTION, SOLUTION INTRAVENOUS ONCE
Status: COMPLETED | OUTPATIENT
Start: 2019-02-24 | End: 2019-02-24

## 2019-02-24 RX ORDER — ONDANSETRON 2 MG/ML
4 INJECTION INTRAMUSCULAR; INTRAVENOUS ONCE
Status: COMPLETED | OUTPATIENT
Start: 2019-02-24 | End: 2019-02-24

## 2019-02-24 RX ORDER — CEPHALEXIN 500 MG/1
500 CAPSULE ORAL 3 TIMES DAILY
Qty: 30 CAPSULE | Refills: 0 | Status: SHIPPED | OUTPATIENT
Start: 2019-02-24 | End: 2019-03-06

## 2019-02-24 RX ADMIN — ONDANSETRON 4 MG: 2 INJECTION INTRAMUSCULAR; INTRAVENOUS at 10:09

## 2019-02-24 RX ADMIN — CEFTRIAXONE 1 G: 1 INJECTION, SOLUTION INTRAVENOUS at 12:58

## 2019-02-24 RX ADMIN — IOPAMIDOL 100 ML: 612 INJECTION, SOLUTION INTRAVENOUS at 11:47

## 2019-02-24 RX ADMIN — KETOROLAC TROMETHAMINE 30 MG: 30 INJECTION, SOLUTION INTRAMUSCULAR; INTRAVENOUS at 10:03

## 2019-02-24 RX ADMIN — SODIUM CHLORIDE 1000 ML: 9 INJECTION, SOLUTION INTRAVENOUS at 10:09

## 2019-02-25 ENCOUNTER — TELEPHONE (OUTPATIENT)
Dept: INTERNAL MEDICINE | Facility: CLINIC | Age: 29
End: 2019-02-25

## 2019-02-26 LAB — BACTERIA SPEC AEROBE CULT: NORMAL

## 2019-03-04 ENCOUNTER — OFFICE VISIT (OUTPATIENT)
Dept: UROLOGY | Facility: CLINIC | Age: 29
End: 2019-03-04

## 2019-03-04 VITALS
HEART RATE: 66 BPM | HEIGHT: 65 IN | OXYGEN SATURATION: 95 % | DIASTOLIC BLOOD PRESSURE: 80 MMHG | SYSTOLIC BLOOD PRESSURE: 120 MMHG | TEMPERATURE: 98.4 F | BODY MASS INDEX: 27.82 KG/M2 | WEIGHT: 167 LBS

## 2019-03-04 DIAGNOSIS — N10 ACUTE PYELONEPHRITIS: ICD-10-CM

## 2019-03-04 DIAGNOSIS — N28.9 RENAL LESION: Primary | ICD-10-CM

## 2019-03-04 DIAGNOSIS — N39.0 RECURRENT UTI: ICD-10-CM

## 2019-03-04 LAB
BILIRUB BLD-MCNC: NEGATIVE MG/DL
CLARITY, POC: CLEAR
COLOR UR: YELLOW
GLUCOSE UR STRIP-MCNC: NEGATIVE MG/DL
KETONES UR QL: NEGATIVE
LEUKOCYTE EST, POC: NEGATIVE
NITRITE UR-MCNC: NEGATIVE MG/ML
PH UR: 7 [PH] (ref 5–8)
PROT UR STRIP-MCNC: NEGATIVE MG/DL
RBC # UR STRIP: NEGATIVE /UL
SP GR UR: 1.02 (ref 1–1.03)
UROBILINOGEN UR QL: NORMAL

## 2019-03-04 PROCEDURE — 81003 URINALYSIS AUTO W/O SCOPE: CPT | Performed by: UROLOGY

## 2019-03-04 PROCEDURE — 51798 US URINE CAPACITY MEASURE: CPT | Performed by: UROLOGY

## 2019-03-04 PROCEDURE — 99204 OFFICE O/P NEW MOD 45 MIN: CPT | Performed by: UROLOGY

## 2019-03-04 RX ORDER — POLYETHYLENE GLYCOL 3350 17 G/17G
17 POWDER, FOR SOLUTION ORAL DAILY
Qty: 30 PACKET | Refills: 11 | Status: SHIPPED | OUTPATIENT
Start: 2019-03-04 | End: 2020-10-02

## 2019-03-04 NOTE — PROGRESS NOTES
Chief Complaint  Frequent UTI    Referring Provider:  No ref. provider found    HPI  Ms. Francis is a 29 y.o. female who presents as a referral for multiple UTIs.    She reports approximately 2 infections in the last 6 months. Ongoing for past 9 years, since son was born.    Her UTI symptoms do not include any suprapubic pain, only increased urgency / frequency, sometimes gross hematuria. Severity moderate. It is associated with pyelonephritis if she does not receive treatment promptly.    no History of inpatient hospitalized for these infections?    yes Records of positive urine cultures?  no Relationship with the onset of these infections and sexual activity?    possibly Use of barrier contraception or a spermicidal products?   yes Ongoing problems with constipation?     no Urinary incontinence?          no History of smoking?         no History of gross hematuria?   no       Vaginal dryness?   Drinks a 26 oz cup of water 4-5x per day usually    She was told at age 19 that she might have vesico- ureteral reflux by urologist in [place].  It is not clear if she had a VCUG to support this diagnosis.  She does not have any hydronephrosis on her CT.    Past Medical History  Past Medical History:   Diagnosis Date   • Finger fracture    • GERD (gastroesophageal reflux disease)    • Kidney infection        Past Surgical History  No past surgical history on file.    Medications    Current Outpatient Medications:   •  cephalexin (KEFLEX) 500 MG capsule, Take 1 capsule by mouth 3 (Three) Times a Day for 10 days., Disp: 30 capsule, Rfl: 0  •  promethazine (PHENERGAN) 25 MG tablet, Take 1/2 to 1 tablet by mouth every 6 hours as needed for nausea., Disp: 20 tablet, Rfl: 0    Allergies  No Known Allergies    Social History  Social History     Socioeconomic History   • Marital status:      Spouse name: Not on file   • Number of children: Not on file   • Years of education: Not on file   • Highest education level: Not on  "file   Social Needs   • Financial resource strain: Not on file   • Food insecurity - worry: Not on file   • Food insecurity - inability: Not on file   • Transportation needs - medical: Not on file   • Transportation needs - non-medical: Not on file   Occupational History     Employer: [employer]   Tobacco Use   • Smoking status: Never Smoker   Substance and Sexual Activity   • Alcohol use: No   • Drug use: No   • Sexual activity: Not on file   Other Topics Concern   • Not on file   Social History Narrative   • Not on file       Family History  She has no family history of kidney stones  Family History   Problem Relation Age of Onset   • Arthritis Mother    • Arthritis Father    • Arthritis Maternal Grandmother        Review of Systems  Constitutional: No fevers or chills  Skin: Negative for rash  Endocrine: No heat/cold intolerance   Cardiovascular: Negative for chest pain or dyspnea on exertion  Respiratory: Negative for shortness of breath or wheezing  Gastrointestinal: No constipation, nausea or vomiting  Genitourinary: Negative for new lower urinary tract symptoms, gross hematuria or dysuria.  Musculoskeletal:  No flank pain  Neurological:  Negative for frequent headaches or dizziness  Lymph/Heme: Negative for leg swelling or calf pain.    Physical Exam  Visit Vitals  /80   Pulse 66   Temp 98.4 °F (36.9 °C)   Ht 165.1 cm (65\")   Wt 75.8 kg (167 lb)   SpO2 95%   BMI 27.79 kg/m²     Constitutional: NAD, WDWN.   HEENT: NCAT. Conjunctivae normal.  MMM.    Cardiovascular:  She has regular rate.  Pulmonary/Chest: Respirations are even and non-labored bilaterally.  Abdominal: Soft. No distension, tenderness, masses or guarding. No CVA tenderness.  Neurological: A + O x 3.  Cranial Nerves II-XII grossly intact.  Extremities: ELTON x 4, Warm. No clubbing.  No cyanosis.    Skin: Pink, warm and dry.  No rashes noted.  Psychiatric:  Normal mood and affect    Genitourinary:   deferred    Labs  Urine cultures reveal a " pattern of bacterial persistence.       Brief Urine Lab Results  (Last result in the past 365 days)      Color   Clarity   Blood   Leuk Est   Nitrite   Protein   CREAT   Urine HCG        03/04/19 1551 Yellow Clear Negative Negative Negative Negative               Post-Void Residual  A post-void residual was measured by ultrasonic bladder scanner by staff.  18    Radiographic Studies  Ct Abdomen Pelvis With Contrast    Result Date: 2/24/2019  Impression: 1.There is a right renal  2.6 x 2.2 cm area of low-attenuation in the inferior right kidney not consistent with a simple cyst. Recommend dedicated renal mass protocol CT or MRI. Given the patient's symptoms findings could be secondary to pyelonephritis, neoplasm is not excluded.  2.. Peripheral enhancing 1.9 cm right adnexal density may be secondary to a cyst, consider pelvic ultrasound  1157.44 mGy.cm     This study was performed with techniques to keep radiation doses as low as reasonably achievable (ALARA). Individualized dose reduction techniques using automated exposure control or adjustment of mA and/or kV according to the patient size were employed.  This report was finalized on 2/24/2019 12:11 PM by Daniel Austin DO.      Assessment  Ms. Francis is a 29 y.o. female who presents with multiple urinary infections over the past 9 years.  She is only had 2 in the past 6 months.  She does not have any history of pelvic surgery, only uncomplicated vaginal delivery.  The patient's risk factors to developing recurrent UTIs include constipation.  Her CT scan shows a possible renal cyst, and an area of cortical blurring that may be consistent with pyelonephritis.  It also shows moderate to large stool burden about her colon.  The fact that there is no hydronephrosis argues against vesicoureteral reflux.     Plan  1. Encourage fluid consumption at the onset of a symptomatic UTI.  2. Various treatment strategies were discussed with the patient including antibiotics in the  form of on-demand, post-coital and suppressive daily dosing.  3. UCx and FU in 6 mo w/ CT renal mass protocol and pelvic exam  4. Daily miralax  5. Consider VCUG in the future if she has another episode of pyelonephritis      Say Lopez MD

## 2019-03-06 LAB
BACTERIA UR CULT: NO GROWTH
BACTERIA UR CULT: NORMAL

## 2019-03-21 ENCOUNTER — OFFICE VISIT (OUTPATIENT)
Dept: OBSTETRICS AND GYNECOLOGY | Facility: CLINIC | Age: 29
End: 2019-03-21

## 2019-03-21 VITALS
DIASTOLIC BLOOD PRESSURE: 74 MMHG | HEIGHT: 65 IN | SYSTOLIC BLOOD PRESSURE: 136 MMHG | BODY MASS INDEX: 27.66 KG/M2 | WEIGHT: 166 LBS

## 2019-03-21 DIAGNOSIS — N83.201 CYST OF RIGHT OVARY: Primary | ICD-10-CM

## 2019-03-21 DIAGNOSIS — Z30.09 FAMILY PLANNING EDUCATION, GUIDANCE, AND COUNSELING: ICD-10-CM

## 2019-03-21 PROCEDURE — 99203 OFFICE O/P NEW LOW 30 MIN: CPT | Performed by: MIDWIFE

## 2019-03-22 NOTE — PROGRESS NOTES
"Subjective   Chief Complaint   Patient presents with   • Ovarian Cyst     had CT done in Feb that showed right ovarian cyst, follow up here today with [facility]      Lindsay Francis is a 29 y.o. year old  presenting to be seen for ovarian cyst.  She has a hx of frequent UTIs and CT scan  of kidneys showed a possible cyst on right adnexa. She states it was 1.9cm.  She was advised by urologist to F/U here with [facility].  She denies any ovarian pain. Periods are monthly lasting 6 days with minimal pain and no heavy flow.  She isn't using birth control but not trying for pregnancy. Her son is 9 years old.      History  Past Medical History:   Diagnosis Date   • Finger fracture    • GERD (gastroesophageal reflux disease)    • Kidney infection    • Urinary tract infection    , Past Surgical History:   Procedure Laterality Date   • TONSILLECTOMY     • WISDOM TOOTH EXTRACTION     , Family History   Problem Relation Age of Onset   • Arthritis Mother    • Arthritis Father    • Arthritis Maternal Grandmother    , Social History     Tobacco Use   • Smoking status: Never Smoker   • Smokeless tobacco: Never Used   Substance Use Topics   • Alcohol use: No   • Drug use: No   ,   (Not in a hospital admission) and Allergies:  Patient has no known allergies.    Social History    Tobacco Use      Smoking status: Never Smoker      Smokeless tobacco: Never Used      Review of Systems  Pertinent items are noted in HPI, all other systems reviewed and negative       Objective   /74   Ht 165.1 cm (65\")   Wt 75.3 kg (166 lb)   Breastfeeding? No   BMI 27.62 kg/m²     Physical Exam:  General Appearance: alert and cooperative  Lungs: clear to auscultation, respirations regular, respirations even and respirations unlabored  Heart: regular rhythm and normal rate and no murmur, gallop, or rubs  Abdomen: no masses and soft non-tender  Extremities: moves extremities well, no edema, no cyanosis and no redness  Skin: color normal and " temperature normal  Neurologic: Mental Status orientated to person, place, time and situation, Speech normal content and execusion    Lab Review   No data reviewed    Imaging   Pelvic ultrasound report   Uterus normal size  Endometrium thick 15.87 ( time for cycle)  Left ovary with 2.3cm cyst         Assessment /Plan    Problems Addressed this Visit     None      Visit Diagnoses     Cyst of right ovary    -  Primary    Relevant Orders    US Non-ob Transvaginal    Family planning education, guidance, and counseling            Contraceptive counseling was provided.  The various options for contraception was discussed including natural family planning, withdrawal method, barrier methods, spermicides, oral contraception, transdermal patch, vaginal ring, injection, implant, and IUDs.  The risks, complications, failure rates, and benefits of each were discussed. She is considering DepoProvera but is concerned about weight gain.   Follow up 1 month for annual exam            This note was electronically signed.  Abby Barber CNM  3/22/2019

## 2019-05-03 ENCOUNTER — OFFICE VISIT (OUTPATIENT)
Dept: OBSTETRICS AND GYNECOLOGY | Facility: CLINIC | Age: 29
End: 2019-05-03

## 2019-05-03 VITALS
HEIGHT: 65 IN | BODY MASS INDEX: 28.16 KG/M2 | SYSTOLIC BLOOD PRESSURE: 124 MMHG | WEIGHT: 169 LBS | DIASTOLIC BLOOD PRESSURE: 70 MMHG

## 2019-05-03 DIAGNOSIS — Z01.419 ENCOUNTER FOR GYNECOLOGICAL EXAMINATION WITHOUT ABNORMAL FINDING: Primary | ICD-10-CM

## 2019-05-03 PROCEDURE — 99395 PREV VISIT EST AGE 18-39: CPT | Performed by: MIDWIFE

## 2019-05-03 NOTE — PROGRESS NOTES
"Subjective   Chief Complaint   Patient presents with   • Gynecologic Exam     last pap about 2 years ago, no complaints      Lindsay Francis is a 29 y.o. year old  presenting to be seen for her annual exam.   Menses are monthly lasting 6 days with minimal cramping and normal flow.  She isn't on birth control but not trying for pregnancy    Past Medical History:   Diagnosis Date   • Finger fracture    • GERD (gastroesophageal reflux disease)    • Kidney infection    • Urinary tract infection         Current Outpatient Medications:   •  polyethylene glycol (MIRALAX) packet, Take 17 g by mouth Daily., Disp: 30 packet, Rfl: 11   No Known Allergies   Past Surgical History:   Procedure Laterality Date   • TONSILLECTOMY     • WISDOM TOOTH EXTRACTION        Social History     Socioeconomic History   • Marital status:      Spouse name: Not on file   • Number of children: Not on file   • Years of education: Not on file   • Highest education level: Not on file   Occupational History     Employer: [employer]   Tobacco Use   • Smoking status: Never Smoker   • Smokeless tobacco: Never Used   Substance and Sexual Activity   • Alcohol use: No   • Drug use: No   • Sexual activity: Yes     Partners: Male      Family History   Problem Relation Age of Onset   • Arthritis Mother    • Arthritis Father    • Arthritis Maternal Grandmother        The following portions of the patient's history were reviewed and updated as appropriate:problem list, current medications, allergies, past family history, past medical history, past social history and past surgical history.    Review of Systems   Constitutional: Negative.    HENT: Negative.    Respiratory: Negative.    Cardiovascular: Negative.    Gastrointestinal: Negative.    Genitourinary: Negative.    Musculoskeletal: Negative.    Skin: Negative.    Neurological: Negative.    Psychiatric/Behavioral: Negative.            Objective   /70   Ht 165.1 cm (65\")   Wt " 76.7 kg (169 lb)   Breastfeeding? No   BMI 28.12 kg/m²     Physical Exam   Constitutional   The patient is awake, alert, well developed, well nourished and well groomed.   Neck   The neck is supple and the trachea is midline. The thyroid is not enlarged and there are no palpable nodules.   Breast  Inspection of the breast reveals symmetrical and smooth breast bilaterally without skin color changes, lesions, dimpling or skin retraction.  The nipples are  everted and without discharge.  Palpation of the breast the tissue is non-tender, smooth, without masses.  The axillae are without masses.   Respiratory  The patient is relaxed and breathes without effort. Lungs CTAB  Cardiovascular  Heart regular rate and rhythm without murmur.  Gastrointestinal   The abdomen is soft and non-tender.  No hepatosplenomegaly.  Genitourinary   - External Genitalia without erythema, lesions, or masses  -Urethral Meatus is without erythema, edema, prolapse or lesions.   -Bladder - Palpation at the region above the symphysis pubis             reveals no bladder tenderness.   -Vagina - There is no excessive vaginal discharge.&    vaginal walls reveals moist vaginal mucosa without inflammation or lesions    There is no evidence of pelvic relaxation; there is no cystocele or rectocele.  -Cervix  is smooth, pink, and without discharge. Negative cervical motion tenderness   Uterus - uterine body is of normal size, shape, & without tenderness  Adnexa structures are nontender and without masses  Perineum is without inflammation or lesions   Extremities  Full ROM. No cyanosis or edema   Skin  Normal in color, texture, moisture, temperature, mobility and turgor. There are no abnormal lesions.    Psychiatric  The patient is oriented to person, place, and time. Speech is fluent and words are clear          Assessment /Plan      Lindsay was seen today for gynecologic exam.    Diagnoses and all orders for this visit:    Encounter for gynecological  examination without abnormal finding  -     Liquid-based Pap Smear, Screening; Future           Encouraged BSE    This note was electronically signed.    Abby Barber CNM   May 3, 2019

## 2019-05-08 ENCOUNTER — TELEPHONE (OUTPATIENT)
Dept: OBSTETRICS AND GYNECOLOGY | Facility: CLINIC | Age: 29
End: 2019-05-08

## 2019-05-08 ENCOUNTER — OFFICE VISIT (OUTPATIENT)
Dept: OBSTETRICS AND GYNECOLOGY | Facility: CLINIC | Age: 29
End: 2019-05-08

## 2019-05-08 VITALS
SYSTOLIC BLOOD PRESSURE: 120 MMHG | HEIGHT: 65 IN | DIASTOLIC BLOOD PRESSURE: 78 MMHG | WEIGHT: 172.8 LBS | BODY MASS INDEX: 28.79 KG/M2

## 2019-05-08 DIAGNOSIS — N76.0 ACUTE VULVOVAGINITIS: Primary | ICD-10-CM

## 2019-05-08 PROCEDURE — 99213 OFFICE O/P EST LOW 20 MIN: CPT | Performed by: PHYSICIAN ASSISTANT

## 2019-05-08 RX ORDER — FLUCONAZOLE 150 MG/1
TABLET ORAL
Qty: 2 TABLET | Refills: 0 | OUTPATIENT
Start: 2019-05-08 | End: 2020-06-03 | Stop reason: HOSPADM

## 2019-05-08 NOTE — PATIENT INSTRUCTIONS
Clinically symptoms consistent with yeast and will treat with diflucan x 2 doses  aptima swab done and will contact with results

## 2019-05-08 NOTE — PROGRESS NOTES
Subjective   Chief Complaint   Patient presents with   • Vaginal Itching     C/O vaginal itching since pap on 5/3/19       Lindsay Francis is a 29 y.o. year old  presenting to be seen for vaginal itching and irritation.  Reports symptoms started Friday afternoon and she had come in for pap Friday morning.  She was not having any symptoms at visit but reports that about one month ago she did have yeast infection after taking some antibiotics. She was given one diflucan to take then by another practioner then ended up using over the counter ovule too. Felt like symptoms cleared up at the time  She has used some Monistat cream over the weekend for her symptoms but hasn't been very helpful  .   Just finished period before having pap Friday    Past Medical History:   Diagnosis Date   • Finger fracture    • GERD (gastroesophageal reflux disease)    • Kidney infection    • Urinary tract infection         Current Outpatient Medications:   •  fluconazole (DIFLUCAN) 150 MG tablet, Take 1 tablet by mouth now, and repeat in 3 days, Disp: 2 tablet, Rfl: 0  •  polyethylene glycol (MIRALAX) packet, Take 17 g by mouth Daily., Disp: 30 packet, Rfl: 11   No Known Allergies   Past Surgical History:   Procedure Laterality Date   • TONSILLECTOMY     • WISDOM TOOTH EXTRACTION        Social History     Socioeconomic History   • Marital status:      Spouse name: Not on file   • Number of children: Not on file   • Years of education: Not on file   • Highest education level: Not on file   Occupational History     Employer: [employer]   Tobacco Use   • Smoking status: Never Smoker   • Smokeless tobacco: Never Used   Substance and Sexual Activity   • Alcohol use: No   • Drug use: No   • Sexual activity: Yes     Partners: Male     Birth control/protection: None      Family History   Problem Relation Age of Onset   • Arthritis Mother    • Arthritis Father    • Arthritis Maternal Grandmother        Review of Systems  "  Constitutional: Negative.    Gastrointestinal: Negative.    Genitourinary: Positive for vaginal discharge and vaginal pain. Negative for difficulty urinating, dysuria and pelvic pain.           Objective   /78   Ht 165.1 cm (65\")   Wt 78.4 kg (172 lb 12.8 oz)   LMP 05/01/2019 (Exact Date)   Breastfeeding? No   BMI 28.76 kg/m²     Physical Exam   Constitutional: She appears well-developed and well-nourished. She is cooperative.   Genitourinary: There is no tenderness or lesion on the right labia. There is no tenderness or lesion on the left labia. Cervix exhibits no motion tenderness, no discharge and no friability. Right adnexum displays no mass and no tenderness. Left adnexum displays no mass and no tenderness. There is erythema and tenderness in the vagina. Vaginal discharge found.   Genitourinary Comments: Labia minora and introitus beefy red with small clumps white exudate c/w yeast   Neurological: She is alert.   Skin: Skin is warm and dry.   Psychiatric: She has a normal mood and affect. Her behavior is normal.            Assessment and Plan  Lindsay was seen today for vaginal itching.    Diagnoses and all orders for this visit:    Acute vulvovaginitis  -     NuSwab VG+ - Swab, Vagina    Other orders  -     fluconazole (DIFLUCAN) 150 MG tablet; Take 1 tablet by mouth now, and repeat in 3 days      Patient Instructions   Clinically symptoms consistent with yeast and will treat with diflucan x 2 doses  aptima swab done and will contact with results             This note was electronically signed.    Анна Orourke PA-C   May 8, 2019  "

## 2019-05-08 NOTE — TELEPHONE ENCOUNTER
----- Message from Simi Mcelroy sent at 5/8/2019  8:32 AM EDT -----  Contact: pt  PT SAW MARY FOR PAP ON 5/3/19 AND EVER SINCE, SHE'S HAD VAGINAL IRRITATION AND BURNING.  DOES SHE NEED TO BE SEEN?  PLEASE CALL HER CELL.  [phone].  THANKS

## 2019-05-09 DIAGNOSIS — Z01.419 ENCOUNTER FOR GYNECOLOGICAL EXAMINATION WITHOUT ABNORMAL FINDING: ICD-10-CM

## 2019-05-11 LAB
A VAGINAE DNA VAG QL NAA+PROBE: ABNORMAL SCORE
BVAB2 DNA VAG QL NAA+PROBE: ABNORMAL SCORE
C ALBICANS DNA VAG QL NAA+PROBE: POSITIVE
C GLABRATA DNA VAG QL NAA+PROBE: NEGATIVE
C TRACH RRNA SPEC QL NAA+PROBE: NEGATIVE
MEGA1 DNA VAG QL NAA+PROBE: ABNORMAL SCORE
N GONORRHOEA RRNA SPEC QL NAA+PROBE: NEGATIVE
T VAGINALIS RRNA SPEC QL NAA+PROBE: NEGATIVE

## 2020-06-03 ENCOUNTER — APPOINTMENT (OUTPATIENT)
Dept: ULTRASOUND IMAGING | Facility: HOSPITAL | Age: 30
End: 2020-06-03

## 2020-06-03 ENCOUNTER — HOSPITAL ENCOUNTER (EMERGENCY)
Facility: HOSPITAL | Age: 30
Discharge: HOME | End: 2020-06-03
Attending: STUDENT IN AN ORGANIZED HEALTH CARE EDUCATION/TRAINING PROGRAM | Admitting: STUDENT IN AN ORGANIZED HEALTH CARE EDUCATION/TRAINING PROGRAM

## 2020-06-03 VITALS
TEMPERATURE: 98 F | DIASTOLIC BLOOD PRESSURE: 81 MMHG | BODY MASS INDEX: 31.19 KG/M2 | OXYGEN SATURATION: 100 % | HEART RATE: 88 BPM | HEIGHT: 65 IN | SYSTOLIC BLOOD PRESSURE: 116 MMHG | RESPIRATION RATE: 18 BRPM | WEIGHT: 187.2 LBS

## 2020-06-03 DIAGNOSIS — R60.0 BILATERAL LOWER EXTREMITY EDEMA: Primary | ICD-10-CM

## 2020-06-03 LAB
ALBUMIN SERPL-MCNC: 4.2 G/DL (ref 3.5–5.2)
ALBUMIN/GLOB SERPL: 1.8 G/DL
ALP SERPL-CCNC: 58 U/L (ref 39–117)
ALT SERPL W P-5'-P-CCNC: 20 U/L (ref 1–33)
ANION GAP SERPL CALCULATED.3IONS-SCNC: 11.1 MMOL/L (ref 5–15)
AST SERPL-CCNC: 20 U/L (ref 1–32)
BASOPHILS # BLD AUTO: 0.03 10*3/MM3 (ref 0–0.2)
BASOPHILS NFR BLD AUTO: 0.5 % (ref 0–1.5)
BILIRUB SERPL-MCNC: 0.3 MG/DL (ref 0.2–1.2)
BUN BLD-MCNC: 10 MG/DL (ref 6–20)
BUN/CREAT SERPL: 15.9 (ref 7–25)
CALCIUM SPEC-SCNC: 9 MG/DL (ref 8.6–10.5)
CHLORIDE SERPL-SCNC: 102 MMOL/L (ref 98–107)
CO2 SERPL-SCNC: 26.9 MMOL/L (ref 22–29)
CREAT BLD-MCNC: 0.63 MG/DL (ref 0.57–1)
DEPRECATED RDW RBC AUTO: 38.9 FL (ref 37–54)
EOSINOPHIL # BLD AUTO: 0.12 10*3/MM3 (ref 0–0.4)
EOSINOPHIL NFR BLD AUTO: 1.9 % (ref 0.3–6.2)
ERYTHROCYTE [DISTWIDTH] IN BLOOD BY AUTOMATED COUNT: 12 % (ref 12.3–15.4)
GFR SERPL CREATININE-BSD FRML MDRD: 113 ML/MIN/1.73
GLOBULIN UR ELPH-MCNC: 2.3 GM/DL
GLUCOSE BLD-MCNC: 103 MG/DL (ref 65–99)
HCT VFR BLD AUTO: 36 % (ref 34–46.6)
HGB BLD-MCNC: 12 G/DL (ref 12–15.9)
IMM GRANULOCYTES # BLD AUTO: 0.02 10*3/MM3 (ref 0–0.05)
IMM GRANULOCYTES NFR BLD AUTO: 0.3 % (ref 0–0.5)
LYMPHOCYTES # BLD AUTO: 1.73 10*3/MM3 (ref 0.7–3.1)
LYMPHOCYTES NFR BLD AUTO: 27.2 % (ref 19.6–45.3)
MCH RBC QN AUTO: 29.7 PG (ref 26.6–33)
MCHC RBC AUTO-ENTMCNC: 33.3 G/DL (ref 31.5–35.7)
MCV RBC AUTO: 89.1 FL (ref 79–97)
MONOCYTES # BLD AUTO: 0.46 10*3/MM3 (ref 0.1–0.9)
MONOCYTES NFR BLD AUTO: 7.2 % (ref 5–12)
NEUTROPHILS # BLD AUTO: 4.01 10*3/MM3 (ref 1.7–7)
NEUTROPHILS NFR BLD AUTO: 62.9 % (ref 42.7–76)
NRBC BLD AUTO-RTO: 0 /100 WBC (ref 0–0.2)
PLATELET # BLD AUTO: 340 10*3/MM3 (ref 140–450)
PMV BLD AUTO: 8.9 FL (ref 6–12)
POTASSIUM BLD-SCNC: 4 MMOL/L (ref 3.5–5.2)
PROT SERPL-MCNC: 6.5 G/DL (ref 6–8.5)
RBC # BLD AUTO: 4.04 10*6/MM3 (ref 3.77–5.28)
SODIUM BLD-SCNC: 140 MMOL/L (ref 136–145)
WBC NRBC COR # BLD: 6.37 10*3/MM3 (ref 3.4–10.8)

## 2020-06-03 PROCEDURE — 36415 COLL VENOUS BLD VENIPUNCTURE: CPT

## 2020-06-03 PROCEDURE — 80053 COMPREHEN METABOLIC PANEL: CPT | Performed by: STUDENT IN AN ORGANIZED HEALTH CARE EDUCATION/TRAINING PROGRAM

## 2020-06-03 PROCEDURE — 85025 COMPLETE CBC W/AUTO DIFF WBC: CPT | Performed by: STUDENT IN AN ORGANIZED HEALTH CARE EDUCATION/TRAINING PROGRAM

## 2020-06-03 PROCEDURE — 99282 EMERGENCY DEPT VISIT SF MDM: CPT

## 2020-06-03 PROCEDURE — 93970 EXTREMITY STUDY: CPT

## 2020-06-04 NOTE — ED PROVIDER NOTES
Subjective   30-year-old female presents with bilateral lower extremity swelling around her ankles and calves for several months.  States is gotten much worse over the past week.  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  She denies pain.  Patient does not smoke and she is not on birth control.  Patient has no history of DVT.  She states her symptoms do improve when she lays down at home and they are worse by the end of the day.          Review of Systems   All other systems reviewed and are negative.      Past Medical History:   Diagnosis Date   • Finger fracture    • GERD (gastroesophageal reflux disease)    • Kidney infection    • Urinary tract infection        No Known Allergies    Past Surgical History:   Procedure Laterality Date   • TONSILLECTOMY     • WISDOM TOOTH EXTRACTION         Family History   Problem Relation Age of Onset   • Arthritis Mother    • Arthritis Father    • Arthritis Maternal Grandmother        Social History     Socioeconomic History   • Marital status:      Spouse name: Not on file   • Number of children: Not on file   • Years of education: Not on file   • Highest education level: Not on file   Occupational History     Employer: [employer]   Tobacco Use   • Smoking status: Never Smoker   • Smokeless tobacco: Never Used   Substance and Sexual Activity   • Alcohol use: Yes     Comment: rarely    • Drug use: No   • Sexual activity: Yes     Partners: Male     Birth control/protection: None           Objective   Physical Exam   Nursing note and vitals reviewed.    GEN: No acute distress  Head: Normocephalic, atraumatic  Eyes: Pupils equal round reactive to light  ENT: Posterior pharynx normal in appearance, oral mucosa is moist  Chest: Nontender to palpation  Cardiovascular: Regular rate  Lungs: Clear to auscultation bilaterally  Abdomen: Soft, nontender, nondistended, no peritoneal  signs  Extremities: Bilateral lower extremity edema.  Strong DP pulse.  Nontender to palpation.  Neuro: GCS 15  Psych: Mood and affect are appropriate        Procedures           ED Course  ED Course as of Jun 03 2159 Wed Jun 03, 2020 2156 US Venous Doppler Lower Extremity Bilateral (duplex)   Order: [id number]   Status:  Final result   Visible to patient:  No (Not Released) Next appt:  None   Details       Reading Physician Reading Date Result Priority  Yo Cali MD 6/3/2020     Narrative & Impression    FINAL REPORT     CLINICAL HISTORY:  SWELLING     FINDINGS:  Color Doppler, duplex Doppler and compression sonography of the  bilateral lower extremities was performed.  There is no evidence  of deep venous thrombosis from the level of the groin to the  calf.  The deep veins are patent and compressible.     IMPRESSION:  No evidence of deep venous thrombosis bilateral lower  extremities.     Authenticated by Yo Cali III, MD on 06/03/2020  09:49:15 PM              [DT]      ED Course User Index  [DT] Filiberto Vargas MD                                           MDM  Number of Diagnoses or Management Options  Bilateral lower extremity edema:      Amount and/or Complexity of Data Reviewed  Clinical lab tests: ordered  Tests in the radiology section of CPT®: reviewed  Decide to obtain previous medical records or to obtain history from someone other than the patient: yes  Review and summarize past medical records: yes  Independent visualization of images, tracings, or specimens: yes        Final diagnoses:   Bilateral lower extremity edema            Filiberto Vargas MD  06/03/20 2159

## 2020-06-04 NOTE — DISCHARGE INSTRUCTIONS
Your ultrasounds were normal.  It is important that you get up and move once every hour.  This will help move some of the fluid that gathers while you sit.

## 2020-10-02 ENCOUNTER — OFFICE VISIT (OUTPATIENT)
Dept: INTERNAL MEDICINE | Facility: CLINIC | Age: 30
End: 2020-10-02

## 2020-10-02 ENCOUNTER — RESULTS ENCOUNTER (OUTPATIENT)
Dept: INTERNAL MEDICINE | Facility: CLINIC | Age: 30
End: 2020-10-02

## 2020-10-02 VITALS
OXYGEN SATURATION: 100 % | WEIGHT: 184 LBS | RESPIRATION RATE: 15 BRPM | HEIGHT: 66 IN | BODY MASS INDEX: 29.57 KG/M2 | HEART RATE: 99 BPM | DIASTOLIC BLOOD PRESSURE: 92 MMHG | SYSTOLIC BLOOD PRESSURE: 133 MMHG | TEMPERATURE: 99.6 F

## 2020-10-02 DIAGNOSIS — N28.9 RENAL LESION: ICD-10-CM

## 2020-10-02 DIAGNOSIS — R80.9 PROTEINURIA, UNSPECIFIED TYPE: ICD-10-CM

## 2020-10-02 DIAGNOSIS — R03.0 ELEVATED BLOOD PRESSURE READING: ICD-10-CM

## 2020-10-02 DIAGNOSIS — R31.9 HEMATURIA, UNSPECIFIED TYPE: ICD-10-CM

## 2020-10-02 DIAGNOSIS — N32.89 BLADDER SPASMS: ICD-10-CM

## 2020-10-02 DIAGNOSIS — R31.9 HEMATURIA, UNSPECIFIED TYPE: Primary | ICD-10-CM

## 2020-10-02 LAB
B-HCG UR QL: NEGATIVE
BILIRUB BLD-MCNC: NEGATIVE MG/DL
CLARITY, POC: ABNORMAL
COLOR UR: ABNORMAL
GLUCOSE UR STRIP-MCNC: NEGATIVE MG/DL
INTERNAL NEGATIVE CONTROL: NEGATIVE
INTERNAL POSITIVE CONTROL: POSITIVE
KETONES UR QL: ABNORMAL
LEUKOCYTE EST, POC: ABNORMAL
Lab: NORMAL
NITRITE UR-MCNC: NEGATIVE MG/ML
PH UR: 6 [PH] (ref 5–8)
PROT UR STRIP-MCNC: ABNORMAL MG/DL
RBC # UR STRIP: ABNORMAL /UL
SP GR UR: 1.03 (ref 1–1.03)
UROBILINOGEN UR QL: NORMAL

## 2020-10-02 PROCEDURE — 99214 OFFICE O/P EST MOD 30 MIN: CPT | Performed by: NURSE PRACTITIONER

## 2020-10-02 PROCEDURE — 81003 URINALYSIS AUTO W/O SCOPE: CPT | Performed by: NURSE PRACTITIONER

## 2020-10-02 PROCEDURE — 81025 URINE PREGNANCY TEST: CPT | Performed by: NURSE PRACTITIONER

## 2020-10-02 RX ORDER — PHENAZOPYRIDINE HYDROCHLORIDE 100 MG/1
100 TABLET, FILM COATED ORAL 3 TIMES DAILY PRN
Qty: 6 TABLET | Refills: 0 | Status: SHIPPED | OUTPATIENT
Start: 2020-10-02 | End: 2021-03-07

## 2020-10-02 RX ORDER — NITROFURANTOIN 25; 75 MG/1; MG/1
100 CAPSULE ORAL 2 TIMES DAILY
Qty: 14 CAPSULE | Refills: 0 | Status: SHIPPED | OUTPATIENT
Start: 2020-10-02 | End: 2020-10-09

## 2020-10-02 NOTE — PROGRESS NOTES
Chief Complaint / Reason:      Chief Complaint   Patient presents with   • Blood in Urine       Subjective     HPI  Patient presents today with complaints of blood in urine but denies N/V, fever or chills. Denies straining or heavy lifting.  Does have some flank pain in left side.  Does report urinary frequency and bladder spasms. Did have CT scan 2/24/19 which  showed right renal 2.6 x 2.2 cm area of low-attenuation in the inferior right kidney not consistent with a simple cyst and was advised to follow up. SHe did see urology previously but was unsure if she followed up regarding repeat CT scan. SHe also had Peripheral enhancing 1.9 cm right adnexal density may be secondary to a cyst in which she saw gyn.    History taken from: patient    PMH/FH/Social History were reviewed and updated appropriately in the electronic medical record.   Past Medical History:   Diagnosis Date   • Finger fracture    • GERD (gastroesophageal reflux disease)    • Kidney infection    • Urinary tract infection      Past Surgical History:   Procedure Laterality Date   • DENTAL PROCEDURE     • TONSILLECTOMY     • WISDOM TOOTH EXTRACTION       Social History     Socioeconomic History   • Marital status:      Spouse name: Not on file   • Number of children: Not on file   • Years of education: Not on file   • Highest education level: Not on file   Occupational History     Employer: [employer]   Tobacco Use   • Smoking status: Never Smoker   • Smokeless tobacco: Never Used   Substance and Sexual Activity   • Alcohol use: Yes     Comment: rarely    • Drug use: No   • Sexual activity: Yes     Partners: Male     Birth control/protection: None     Family History   Problem Relation Age of Onset   • Arthritis Mother    • Arthritis Father    • Arthritis Maternal Grandmother        Review of Systems:   Review of Systems   Constitutional: Positive for fatigue.   Respiratory: Negative.    Cardiovascular: Negative.    Genitourinary: Positive  for dysuria and hematuria.   Musculoskeletal: Positive for back pain.   Allergic/Immunologic: Positive for environmental allergies.   Neurological: Negative.    Psychiatric/Behavioral: Negative.  Positive for stress.         All other systems were reviewed and are negative.  Exceptions are noted in the subjective or above.      Objective     Vital Signs  Vitals:    10/02/20 1500   BP: 133/92   Pulse: 99   Resp: 15   Temp: 99.6 °F (37.6 °C)   SpO2: 100%       Body mass index is 30.15 kg/m².    Physical Exam  Vitals signs and nursing note reviewed.   Constitutional:       Appearance: She is well-developed.   HENT:      Head: Normocephalic.   Eyes:      Conjunctiva/sclera: Conjunctivae normal.      Pupils: Pupils are equal, round, and reactive to light.   Neck:      Musculoskeletal: Normal range of motion and neck supple.   Cardiovascular:      Rate and Rhythm: Normal rate and regular rhythm.      Pulses: Normal pulses.      Heart sounds: Normal heart sounds.   Pulmonary:      Effort: Pulmonary effort is normal.      Breath sounds: Normal breath sounds.   Abdominal:      General: Bowel sounds are normal. There is no distension.      Palpations: Abdomen is soft. There is no mass.      Tenderness: There is abdominal tenderness. There is no right CVA tenderness, left CVA tenderness, guarding or rebound.      Hernia: No hernia is present.   Musculoskeletal: Normal range of motion.   Skin:     General: Skin is warm and dry.   Neurological:      Mental Status: She is alert and oriented to person, place, and time.   Psychiatric:         Mood and Affect: Mood normal.         Behavior: Behavior normal.         Thought Content: Thought content normal.         Judgment: Judgment normal.              Results Review:    I reviewed the patient's new clinical results.   Office Visit on 10/02/2020   Component Date Value Ref Range Status   • Color 10/02/2020 Red* Yellow, Straw, Dark Yellow, Fatemeh Final   • Clarity, UA 10/02/2020  Cloudy* Clear Final   • Specific Gravity  10/02/2020 1.030  1.005 - 1.030 Final   • pH, Urine 10/02/2020 6.0  5.0 - 8.0 Final   • Leukocytes 10/02/2020 Moderate (2+)* Negative Final   • Nitrite, UA 10/02/2020 Negative  Negative Final   • Protein, POC 10/02/2020 3+* Negative mg/dL Final   • Glucose, UA 10/02/2020 Negative  Negative, 1000 mg/dL (3+) mg/dL Final   • Ketones, UA 10/02/2020 1+* Negative Final   • Urobilinogen, UA 10/02/2020 Normal  Normal Final   • Bilirubin 10/02/2020 Negative  Negative Final   • Blood, UA 10/02/2020 3+* Negative Final   • HCG, Urine, QL 10/02/2020 Negative  Negative Final   • Lot Number 10/02/2020 [id number]   Final   • Internal Positive Control 10/02/2020 Positive   Final   • Internal Negative Control 10/02/2020 Negative   Final         Medication Review:     Current Outpatient Medications:   •  nitrofurantoin, macrocrystal-monohydrate, (Macrobid) 100 MG capsule, Take 1 capsule by mouth 2 (Two) Times a Day for 7 days., Disp: 14 capsule, Rfl: 0  •  phenazopyridine (Pyridium) 100 MG tablet, Take 1 tablet by mouth 3 (Three) Times a Day As Needed for Bladder Spasms., Disp: 6 tablet, Rfl: 0    Assessment/Plan   Diagnoses and all orders for this visit:    1. Hematuria, unspecified type (Primary)  -     POCT urinalysis dipstick, automated  -     POCT pregnancy, urine  -     Urine Culture - Urine, Urine, Clean Catch; Future  -     nitrofurantoin, macrocrystal-monohydrate, (Macrobid) 100 MG capsule; Take 1 capsule by mouth 2 (Two) Times a Day for 7 days.  Dispense: 14 capsule; Refill: 0  -     CT Abdomen Pelvis With & Without Contrast    2. Renal lesion  -     CT Abdomen Pelvis With & Without Contrast    3. Elevated blood pressure reading  Initiate lifestyle modifications.   DASH Diet and exercise.   discussed ways to prevent of long-term complications from high blood pressure.  Discussed when to seek medical attention.  Encouraged patient to take blood pressure daily and keep a log.      4.  Proteinuria, unspecified type  Recommend hydration and closely monitoring BP at home.   5. Bladder spasms  -     phenazopyridine (Pyridium) 100 MG tablet; Take 1 tablet by mouth 3 (Three) Times a Day As Needed for Bladder Spasms.  Dispense: 6 tablet; Refill: 0        Return if symptoms worsen or fail to improve, for Annual.    Theresa Esteves, APRN  10/02/2020

## 2020-10-03 ENCOUNTER — APPOINTMENT (OUTPATIENT)
Dept: CT IMAGING | Facility: HOSPITAL | Age: 30
End: 2020-10-03

## 2020-10-03 ENCOUNTER — HOSPITAL ENCOUNTER (EMERGENCY)
Facility: HOSPITAL | Age: 30
Discharge: HOME | End: 2020-10-03
Attending: EMERGENCY MEDICINE | Admitting: EMERGENCY MEDICINE

## 2020-10-03 VITALS
DIASTOLIC BLOOD PRESSURE: 84 MMHG | OXYGEN SATURATION: 100 % | HEART RATE: 89 BPM | HEIGHT: 65 IN | RESPIRATION RATE: 16 BRPM | WEIGHT: 186.2 LBS | BODY MASS INDEX: 31.02 KG/M2 | SYSTOLIC BLOOD PRESSURE: 120 MMHG | TEMPERATURE: 98.6 F

## 2020-10-03 DIAGNOSIS — N39.0 ACUTE UTI: Primary | ICD-10-CM

## 2020-10-03 LAB
ALBUMIN SERPL-MCNC: 4.4 G/DL (ref 3.5–5.2)
ALBUMIN/GLOB SERPL: 2.1 G/DL
ALP SERPL-CCNC: 55 U/L (ref 39–117)
ALT SERPL W P-5'-P-CCNC: 13 U/L (ref 1–33)
ANION GAP SERPL CALCULATED.3IONS-SCNC: 8.6 MMOL/L (ref 5–15)
AST SERPL-CCNC: 14 U/L (ref 1–32)
B-HCG UR QL: NEGATIVE
BACTERIA UR QL AUTO: ABNORMAL /HPF
BASOPHILS # BLD AUTO: 0.03 10*3/MM3 (ref 0–0.2)
BASOPHILS NFR BLD AUTO: 0.4 % (ref 0–1.5)
BILIRUB SERPL-MCNC: 0.3 MG/DL (ref 0–1.2)
BILIRUB UR QL STRIP: NEGATIVE
BUN SERPL-MCNC: 13 MG/DL (ref 6–20)
BUN/CREAT SERPL: 24.1 (ref 7–25)
CALCIUM SPEC-SCNC: 9 MG/DL (ref 8.6–10.5)
CHLORIDE SERPL-SCNC: 101 MMOL/L (ref 98–107)
CLARITY UR: ABNORMAL
CO2 SERPL-SCNC: 26.4 MMOL/L (ref 22–29)
COLOR UR: YELLOW
CREAT SERPL-MCNC: 0.54 MG/DL (ref 0.57–1)
DEPRECATED RDW RBC AUTO: 39.6 FL (ref 37–54)
EOSINOPHIL # BLD AUTO: 0.09 10*3/MM3 (ref 0–0.4)
EOSINOPHIL NFR BLD AUTO: 1.3 % (ref 0.3–6.2)
ERYTHROCYTE [DISTWIDTH] IN BLOOD BY AUTOMATED COUNT: 11.9 % (ref 12.3–15.4)
GFR SERPL CREATININE-BSD FRML MDRD: 134 ML/MIN/1.73
GLOBULIN UR ELPH-MCNC: 2.1 GM/DL
GLUCOSE SERPL-MCNC: 107 MG/DL (ref 65–99)
GLUCOSE UR STRIP-MCNC: NEGATIVE MG/DL
HCT VFR BLD AUTO: 38 % (ref 34–46.6)
HGB BLD-MCNC: 12.8 G/DL (ref 12–15.9)
HGB UR QL STRIP.AUTO: NEGATIVE
HOLD SPECIMEN: NORMAL
HOLD SPECIMEN: NORMAL
HYALINE CASTS UR QL AUTO: ABNORMAL /LPF
IMM GRANULOCYTES # BLD AUTO: 0.02 10*3/MM3 (ref 0–0.05)
IMM GRANULOCYTES NFR BLD AUTO: 0.3 % (ref 0–0.5)
KETONES UR QL STRIP: NEGATIVE
LEUKOCYTE ESTERASE UR QL STRIP.AUTO: ABNORMAL
LIPASE SERPL-CCNC: 33 U/L (ref 13–60)
LYMPHOCYTES # BLD AUTO: 1.37 10*3/MM3 (ref 0.7–3.1)
LYMPHOCYTES NFR BLD AUTO: 19.7 % (ref 19.6–45.3)
MCH RBC QN AUTO: 30.4 PG (ref 26.6–33)
MCHC RBC AUTO-ENTMCNC: 33.7 G/DL (ref 31.5–35.7)
MCV RBC AUTO: 90.3 FL (ref 79–97)
MONOCYTES # BLD AUTO: 0.44 10*3/MM3 (ref 0.1–0.9)
MONOCYTES NFR BLD AUTO: 6.3 % (ref 5–12)
NEUTROPHILS NFR BLD AUTO: 5.02 10*3/MM3 (ref 1.7–7)
NEUTROPHILS NFR BLD AUTO: 72 % (ref 42.7–76)
NITRITE UR QL STRIP: NEGATIVE
NRBC BLD AUTO-RTO: 0 /100 WBC (ref 0–0.2)
PH UR STRIP.AUTO: 7 [PH] (ref 5–8)
PLATELET # BLD AUTO: 335 10*3/MM3 (ref 140–450)
PMV BLD AUTO: 9.1 FL (ref 6–12)
POTASSIUM SERPL-SCNC: 3.8 MMOL/L (ref 3.5–5.2)
PROT SERPL-MCNC: 6.5 G/DL (ref 6–8.5)
PROT UR QL STRIP: NEGATIVE
RBC # BLD AUTO: 4.21 10*6/MM3 (ref 3.77–5.28)
RBC # UR: ABNORMAL /HPF
REF LAB TEST METHOD: ABNORMAL
SODIUM SERPL-SCNC: 136 MMOL/L (ref 136–145)
SP GR UR STRIP: 1.02 (ref 1–1.03)
SQUAMOUS #/AREA URNS HPF: ABNORMAL /HPF
UROBILINOGEN UR QL STRIP: ABNORMAL
WBC # BLD AUTO: 6.97 10*3/MM3 (ref 3.4–10.8)
WBC UR QL AUTO: ABNORMAL /HPF
WHOLE BLOOD HOLD SPECIMEN: NORMAL
WHOLE BLOOD HOLD SPECIMEN: NORMAL

## 2020-10-03 PROCEDURE — 81001 URINALYSIS AUTO W/SCOPE: CPT

## 2020-10-03 PROCEDURE — 87086 URINE CULTURE/COLONY COUNT: CPT | Performed by: NURSE PRACTITIONER

## 2020-10-03 PROCEDURE — 83690 ASSAY OF LIPASE: CPT

## 2020-10-03 PROCEDURE — 81025 URINE PREGNANCY TEST: CPT

## 2020-10-03 PROCEDURE — 85025 COMPLETE CBC W/AUTO DIFF WBC: CPT

## 2020-10-03 PROCEDURE — 74176 CT ABD & PELVIS W/O CONTRAST: CPT

## 2020-10-03 PROCEDURE — 99283 EMERGENCY DEPT VISIT LOW MDM: CPT

## 2020-10-03 PROCEDURE — 80053 COMPREHEN METABOLIC PANEL: CPT

## 2020-10-03 RX ORDER — ONDANSETRON 2 MG/ML
4 INJECTION INTRAMUSCULAR; INTRAVENOUS ONCE
Status: DISCONTINUED | OUTPATIENT
Start: 2020-10-03 | End: 2020-10-03 | Stop reason: HOSPADM

## 2020-10-03 RX ORDER — SODIUM CHLORIDE 0.9 % (FLUSH) 0.9 %
10 SYRINGE (ML) INJECTION AS NEEDED
Status: DISCONTINUED | OUTPATIENT
Start: 2020-10-03 | End: 2020-10-03 | Stop reason: HOSPADM

## 2020-10-03 RX ADMIN — SODIUM CHLORIDE 1000 ML: 9 INJECTION, SOLUTION INTRAVENOUS at 17:53

## 2020-10-03 NOTE — ED PROVIDER NOTES
Subjective   History of Present Illness  This is a 30 year old female who comes in complaining of left flank pain and hematuria. She reported dysuria and hematuria for two days. She went to see her PCP and was started on abx. She reports she started having left flank pain this morning and the pain has increased. She denies any fever, nausea or vomiting.   Review of Systems   Constitutional: Negative.    HENT: Negative.    Eyes: Negative.    Respiratory: Negative.    Cardiovascular: Negative.    Gastrointestinal: Negative.    Endocrine: Negative.    Genitourinary: Positive for dysuria, flank pain and hematuria.   Skin: Negative.    Allergic/Immunologic: Negative.    Neurological: Negative.    Hematological: Negative.    Psychiatric/Behavioral: Negative.        Past Medical History:   Diagnosis Date   • Finger fracture    • GERD (gastroesophageal reflux disease)    • Kidney infection    • Urinary tract infection        No Known Allergies    Past Surgical History:   Procedure Laterality Date   • DENTAL PROCEDURE     • TONSILLECTOMY     • WISDOM TOOTH EXTRACTION         Family History   Problem Relation Age of Onset   • Arthritis Mother    • Arthritis Father    • Arthritis Maternal Grandmother        Social History     Socioeconomic History   • Marital status:      Spouse name: Not on file   • Number of children: Not on file   • Years of education: Not on file   • Highest education level: Not on file   Occupational History     Employer: [employer]   Tobacco Use   • Smoking status: Never Smoker   • Smokeless tobacco: Never Used   Substance and Sexual Activity   • Alcohol use: Yes     Comment: rarely    • Drug use: No   • Sexual activity: Yes     Partners: Male     Birth control/protection: None           Objective   Physical Exam  Vitals signs and nursing note reviewed.   Constitutional:       Appearance: Normal appearance. She is normal weight.   Neurological:      Mental Status: She is alert.     GEN: No  acute distress  Head: Normocephalic, atraumatic  Eyes: Pupils equal round reactive to light  ENT: Posterior pharynx normal in appearance, oral mucosa is moist  Chest: Nontender to palpation  Cardiovascular: Regular rate  Lungs: Clear to auscultation bilaterally  Abdomen: Soft, nontender, nondistended, no peritoneal signs  Extremities: No edema, normal appearance  Neuro: GCS 15  Psych: Mood and affect are appropriate      Procedures           ED Course  ED Course as of Oct 03 1836   Sat Oct 03, 2020   1835 I have discussed the findings with the patient. I have advised her to follow up with her pcp in the next 24-48 hours. She is agreeable to this plan of care.     [TW]      ED Course User Index  [TW] Lory Grant, JULIO C                                           MDM  Number of Diagnoses or Management Options     Amount and/or Complexity of Data Reviewed  Clinical lab tests: ordered and reviewed  Tests in the radiology section of CPT®: ordered and reviewed  Review and summarize past medical records: yes  Discuss the patient with other providers: yes    Risk of Complications, Morbidity, and/or Mortality  Presenting problems: moderate  Diagnostic procedures: moderate  Management options: moderate        Final diagnoses:   Acute UTI            Lory Grant APRN  10/03/20 1836

## 2020-10-04 LAB — BACTERIA SPEC AEROBE CULT: NO GROWTH

## 2020-10-05 ENCOUNTER — TELEPHONE (OUTPATIENT)
Dept: INTERNAL MEDICINE | Facility: CLINIC | Age: 30
End: 2020-10-05

## 2020-10-05 NOTE — TELEPHONE ENCOUNTER
Patient informed not back yet. Will let her know asap. She is not urinating blood anymore. Went to ER Saturday. CT repeated. Still want MRI? appt with urology on 10/28/20

## 2020-10-05 NOTE — TELEPHONE ENCOUNTER
I contacted patient and spoke with her regarding results of urine culture she previously had CT scan that was negative so I advised her to follow-up with urology and she has an appointment with urology at [facility] and also with GYN.

## 2020-10-05 NOTE — TELEPHONE ENCOUNTER
PATIENT IS REQUESTING A CALL BACK WITH THE RESULTS OF HER URINALYSIS TO MAKE SURE SHE IS ON THE CORRECT ANTIBIOTICS.     PLEASE CALL PATIENT AND ADVISE [phone]

## 2020-11-06 ENCOUNTER — OFFICE VISIT (OUTPATIENT)
Dept: OBSTETRICS AND GYNECOLOGY | Facility: CLINIC | Age: 30
End: 2020-11-06

## 2020-11-06 VITALS
DIASTOLIC BLOOD PRESSURE: 80 MMHG | SYSTOLIC BLOOD PRESSURE: 126 MMHG | WEIGHT: 188.4 LBS | BODY MASS INDEX: 31.39 KG/M2 | HEIGHT: 65 IN

## 2020-11-06 DIAGNOSIS — Z01.419 ENCOUNTER FOR GYNECOLOGICAL EXAMINATION WITHOUT ABNORMAL FINDING: Primary | ICD-10-CM

## 2020-11-06 PROCEDURE — 99395 PREV VISIT EST AGE 18-39: CPT | Performed by: MIDWIFE

## 2020-11-06 NOTE — PROGRESS NOTES
Subjective   Chief Complaint   Patient presents with   • Gynecologic Exam     Last pap done 5/3/19-WNL, Patient would like to discuss ovarian cyst     Lindsay Francis is a 30 y.o. year old  presenting to be seen for her annual exam.   She is concerned that she has been having ovarian cysts but she recently had a CT scan in October that showed normal uterus and ovaries.  She is having monthly menses that last 6 to 7 days.  The last 2 months she has had light periods lasting only 2 days and then some spotting.  She is not using any birth control and not trying for pregnancy.  Her son is 10.  She has been seeing a urologist for kidney and bladder issues.    Past Medical History:   Diagnosis Date   • Finger fracture    • GERD (gastroesophageal reflux disease)    • Kidney infection    • Urinary tract infection         Current Outpatient Medications:   •  phenazopyridine (Pyridium) 100 MG tablet, Take 1 tablet by mouth 3 (Three) Times a Day As Needed for Bladder Spasms., Disp: 6 tablet, Rfl: 0   No Known Allergies   Past Surgical History:   Procedure Laterality Date   • DENTAL PROCEDURE     • TONSILLECTOMY     • WISDOM TOOTH EXTRACTION        Social History     Socioeconomic History   • Marital status:      Spouse name: Not on file   • Number of children: Not on file   • Years of education: Not on file   • Highest education level: Not on file   Occupational History     Employer: [employer]   Tobacco Use   • Smoking status: Never Smoker   • Smokeless tobacco: Never Used   Substance and Sexual Activity   • Alcohol use: Yes     Comment: rarely    • Drug use: No   • Sexual activity: Yes     Partners: Male     Birth control/protection: None      Family History   Problem Relation Age of Onset   • Arthritis Mother    • Arthritis Father    • Arthritis Maternal Grandmother        The following portions of the patient's history were reviewed and updated as appropriate:problem list, current medications,  "allergies, past family history, past medical history, past social history and past surgical history.    Review of Systems   Constitutional: Negative.    Respiratory: Negative.    Cardiovascular: Negative.    Gastrointestinal: Negative.    Musculoskeletal: Negative.    Skin: Negative.    Psychiatric/Behavioral: Negative.    All other systems reviewed and are negative.          Objective   /80   Ht 165.1 cm (65\")   Wt 85.5 kg (188 lb 6.4 oz)   LMP 10/17/2020 (Exact Date)   Breastfeeding No   BMI 31.35 kg/m²     Physical Exam   Constitutional   The patient is awake, alert, well developed, well nourished and well groomed.   Neck   The neck is supple and the trachea is midline. The thyroid is not enlarged and there are no palpable nodules.   Breast  Inspection of the breast reveals symmetrical and smooth breast bilaterally without skin color changes, lesions, dimpling or skin retraction.  The nipples are  everted and without discharge.  Palpation of the breast the tissue is non-tender, smooth, without masses.  The axillae are without masses.   Respiratory  The patient is relaxed and breathes without effort. Lungs CTAB  Cardiovascular  Heart regular rate and rhythm without murmur.  Gastrointestinal   The abdomen is soft and non-tender.  No hepatosplenomegaly.  Genitourinary   - External Genitalia without erythema, lesions, or masses  -Urethral Meatus is without erythema, edema, prolapse or lesions.   -Bladder - Palpation at the region above the symphysis pubis             reveals no bladder tenderness.   -Vagina - There is no excessive vaginal discharge.&    vaginal walls reveals moist vaginal mucosa without inflammation or lesions    There is no evidence of pelvic relaxation; there is no cystocele or rectocele.  -Cervix  is smooth, pink, and without discharge. Negative cervical motion tenderness   Uterus - uterine body is of normal size, shape, & without tenderness  Adnexa structures are nontender and without " masses  Perineum is without inflammation or lesions   Extremities  Full ROM. No cyanosis or edema   Skin  Normal in color, texture, moisture, temperature, mobility and turgor. There are no abnormal lesions.    Psychiatric  The patient is oriented to person, place, and time. Speech is fluent and words are clear          Assessment /Plan      Diagnoses and all orders for this visit:    1. Encounter for gynecological examination without abnormal finding (Primary)  -     Pap IG, Rfx HPV ASCU; Future    Pap was done today.  If she does not receive the results of the Pap within 2 weeks  time, she was instructed to call to find out the results.  I explained to Lindsay that the recommendations for Pap smear interval in a low risk patient  has lengthened to 3 years time.  I encouraged her to be seen yearly for a full physical exam including breast and pelvic exam even during the off years when PAP's will not be performed.           Encouraged BSE, healthy eating, and exercise  Discussed use of condoms    This note was electronically signed.    Abby Barber CNM   November 6, 2020

## 2020-11-19 DIAGNOSIS — Z01.419 ENCOUNTER FOR GYNECOLOGICAL EXAMINATION WITHOUT ABNORMAL FINDING: ICD-10-CM

## 2021-05-09 ENCOUNTER — HOSPITAL ENCOUNTER (EMERGENCY)
Facility: HOSPITAL | Age: 31
Discharge: HOME | End: 2021-05-10
Attending: EMERGENCY MEDICINE | Admitting: EMERGENCY MEDICINE

## 2021-05-09 VITALS
BODY MASS INDEX: 30.82 KG/M2 | OXYGEN SATURATION: 100 % | TEMPERATURE: 98 F | HEART RATE: 82 BPM | DIASTOLIC BLOOD PRESSURE: 84 MMHG | SYSTOLIC BLOOD PRESSURE: 141 MMHG | RESPIRATION RATE: 18 BRPM | WEIGHT: 185 LBS | HEIGHT: 65 IN

## 2021-05-09 DIAGNOSIS — S93.492A SPRAIN OF ANTERIOR TALOFIBULAR LIGAMENT OF LEFT ANKLE, INITIAL ENCOUNTER: Primary | ICD-10-CM

## 2021-05-09 PROCEDURE — 99284 EMERGENCY DEPT VISIT MOD MDM: CPT

## 2021-05-09 PROCEDURE — 99283 EMERGENCY DEPT VISIT LOW MDM: CPT

## 2021-05-09 RX ORDER — IBUPROFEN 800 MG/1
800 TABLET ORAL ONCE
Status: COMPLETED | OUTPATIENT
Start: 2021-05-09 | End: 2021-05-09

## 2021-05-09 RX ADMIN — IBUPROFEN 800 MG: 800 TABLET, FILM COATED ORAL at 23:59

## 2021-05-10 ENCOUNTER — APPOINTMENT (OUTPATIENT)
Dept: GENERAL RADIOLOGY | Facility: HOSPITAL | Age: 31
End: 2021-05-10

## 2021-05-10 PROCEDURE — 73610 X-RAY EXAM OF ANKLE: CPT

## 2021-05-20 ENCOUNTER — OFFICE VISIT (OUTPATIENT)
Dept: INTERNAL MEDICINE | Facility: CLINIC | Age: 31
End: 2021-05-20

## 2021-05-20 VITALS
SYSTOLIC BLOOD PRESSURE: 125 MMHG | HEART RATE: 107 BPM | BODY MASS INDEX: 31.82 KG/M2 | WEIGHT: 191 LBS | HEIGHT: 65 IN | OXYGEN SATURATION: 99 % | TEMPERATURE: 99.9 F | DIASTOLIC BLOOD PRESSURE: 77 MMHG | RESPIRATION RATE: 15 BRPM

## 2021-05-20 DIAGNOSIS — M79.672 LEFT FOOT PAIN: ICD-10-CM

## 2021-05-20 DIAGNOSIS — R30.0 DYSURIA: Primary | ICD-10-CM

## 2021-05-20 DIAGNOSIS — S99.922S FOOT INJURY, LEFT, SEQUELA: ICD-10-CM

## 2021-05-20 DIAGNOSIS — K59.04 CHRONIC IDIOPATHIC CONSTIPATION: ICD-10-CM

## 2021-05-20 LAB
BILIRUB BLD-MCNC: NEGATIVE MG/DL
CLARITY, POC: ABNORMAL
COLOR UR: YELLOW
GLUCOSE UR STRIP-MCNC: NEGATIVE MG/DL
KETONES UR QL: NEGATIVE
LEUKOCYTE EST, POC: ABNORMAL
NITRITE UR-MCNC: NEGATIVE MG/ML
PH UR: 7 [PH] (ref 5–8)
PROT UR STRIP-MCNC: NEGATIVE MG/DL
RBC # UR STRIP: ABNORMAL /UL
SP GR UR: 1.02 (ref 1–1.03)
UROBILINOGEN UR QL: NORMAL

## 2021-05-20 PROCEDURE — 99214 OFFICE O/P EST MOD 30 MIN: CPT | Performed by: NURSE PRACTITIONER

## 2021-05-20 PROCEDURE — 81003 URINALYSIS AUTO W/O SCOPE: CPT | Performed by: NURSE PRACTITIONER

## 2021-05-20 RX ORDER — NITROFURANTOIN 25; 75 MG/1; MG/1
100 CAPSULE ORAL 2 TIMES DAILY
Qty: 14 CAPSULE | Refills: 0 | Status: SHIPPED | OUTPATIENT
Start: 2021-05-20 | End: 2021-05-27

## 2021-05-20 NOTE — PROGRESS NOTES
Chief Complaint / Reason:      Chief Complaint   Patient presents with   • Urinary Tract Infection       Subjective     HPI  Patient presents today with UTI symptoms but denies fever or chills she states symptoms have been going on for about a week and she did try taking Pyridium and 1 dose of an antibiotic with minimal relief.  Denies fever chills but vital signs show increased heart rate and low-grade fever.  She denies chest pain, shortness of breath or heart palpitations.  She does have a history of constipation and has seenUrology and had imaging of abdomen and pelvis along with cystoureterogram.  She states that they had mentioned sending her to infectious disease due to recurrent UTIs as imaging was negative.  She does have history of renal cyst.  Patient also complains of left lateral ankle pain and swelling as she was seen at [facility] emergency department on 5/9/2021 and stated that her leg had fell asleep when she stood up she rolled her ankle.  She stated that she had injured it in the past and she currently is in a walking boot.  She states that it continues to be swollen and have bruising.  She has had previous imaging in 2018 of left ankle and then had repeat imaging 5/10/2021 and both x-rays were negative for fracture or dislocation.  She states that the outer side of her foot is very tender and hurts to walk.  History taken from: patient    PMH/FH/Social History were reviewed and updated appropriately in the electronic medical record.   Past Medical History:   Diagnosis Date   • Finger fracture    • GERD (gastroesophageal reflux disease)    • Kidney infection    • Urinary tract infection      Past Surgical History:   Procedure Laterality Date   • DENTAL PROCEDURE     • TONSILLECTOMY     • WISDOM TOOTH EXTRACTION       Social History     Socioeconomic History   • Marital status:      Spouse name: Not on file   • Number of children: Not on file   • Years of education: Not on  file   • Highest education level: Not on file   Tobacco Use   • Smoking status: Never Smoker   • Smokeless tobacco: Never Used   Substance and Sexual Activity   • Alcohol use: Yes     Comment: rarely    • Drug use: No   • Sexual activity: Yes     Partners: Male     Birth control/protection: None     Family History   Problem Relation Age of Onset   • Arthritis Mother    • Arthritis Father    • Arthritis Maternal Grandmother        Review of Systems:   Review of Systems   Constitutional: Positive for activity change and fatigue.   Respiratory: Negative.    Cardiovascular: Negative.    Gastrointestinal: Positive for constipation.   Genitourinary: Positive for dysuria and urgency.   Musculoskeletal: Positive for arthralgias, gait problem, joint swelling and myalgias.   Allergic/Immunologic: Positive for environmental allergies.   Psychiatric/Behavioral: Positive for sleep disturbance and stress.         All other systems were reviewed and are negative.  Exceptions are noted in the subjective or above.      Objective     Vital Signs  Vitals:    05/20/21 1441   BP: 125/77   Pulse: 107   Resp: 15   Temp: 99.9 °F (37.7 °C)   SpO2: 99%       Body mass index is 31.78 kg/m².    Physical Exam  Vitals and nursing note reviewed.   Constitutional:       Appearance: She is well-developed.   HENT:      Head: Normocephalic.   Eyes:      Conjunctiva/sclera: Conjunctivae normal.      Pupils: Pupils are equal, round, and reactive to light.   Cardiovascular:      Rate and Rhythm: Regular rhythm. Tachycardia present.      Pulses: Normal pulses.      Heart sounds: Normal heart sounds.   Pulmonary:      Effort: Pulmonary effort is normal.      Breath sounds: Normal breath sounds.   Abdominal:      General: Bowel sounds are normal. There is no distension.      Palpations: Abdomen is soft. There is no mass.      Tenderness: There is abdominal tenderness. There is no right CVA tenderness, left CVA tenderness, guarding or rebound.      Hernia:  No hernia is present.   Musculoskeletal:         General: Swelling, tenderness and deformity present. Normal range of motion.      Cervical back: Normal range of motion and neck supple.      Left foot: Normal capillary refill. Swelling, deformity and bony tenderness present. Normal pulse.        Legs:       Comments: Bruising noted on lateral side of left foot that extends from ankle down to toes   Skin:     General: Skin is warm and dry.   Neurological:      Mental Status: She is alert and oriented to person, place, and time.   Psychiatric:         Mood and Affect: Mood normal.         Behavior: Behavior normal.         Thought Content: Thought content normal.         Judgment: Judgment normal.              Results Review:    I reviewed the patient's new clinical results.   Office Visit on 05/20/2021   Component Date Value Ref Range Status   • Color 05/20/2021 Yellow  Yellow, Straw, Dark Yellow, Fatemeh Final   • Clarity, UA 05/20/2021 Cloudy* Clear Final   • Specific Gravity  05/20/2021 1.020  1.005 - 1.030 Final   • pH, Urine 05/20/2021 7.0  5.0 - 8.0 Final   • Leukocytes 05/20/2021 Small (1+)* Negative Final   • Nitrite, UA 05/20/2021 Negative  Negative Final   • Protein, POC 05/20/2021 Negative  Negative mg/dL Final   • Glucose, UA 05/20/2021 Negative  Negative, 1000 mg/dL (3+) mg/dL Final   • Ketones, UA 05/20/2021 Negative  Negative Final   • Urobilinogen, UA 05/20/2021 Normal  Normal Final   • Bilirubin 05/20/2021 Negative  Negative Final   • Blood, UA 05/20/2021 1+* Negative Final         Medication Review:     Current Outpatient Medications:   •  nitrofurantoin, macrocrystal-monohydrate, (Macrobid) 100 MG capsule, Take 1 capsule by mouth 2 (Two) Times a Day for 7 days., Disp: 14 capsule, Rfl: 0    Diagnoses and all orders for this visit:    Dysuria  -     POCT urinalysis dipstick, automated  -     Urine Culture - Urine, Urine, Clean Catch  -     nitrofurantoin, macrocrystal-monohydrate, (Macrobid) 100 MG  capsule; Take 1 capsule by mouth 2 (Two) Times a Day for 7 days.  Increase fluid intake and rest.  Void often to empty bladder.  Wipe from front to back.  Empty your bladder after intercourse.   Avoid potentially irritating feminine products.  Follow up as needed or if symptoms worsen.    Left foot pain  -     Ambulatory Referral to Orthopedic Surgery  Discussed differential diagnosis along with nonpharmacological interventions recommend applying ice rest and elevation.  Foot injury, left, sequela  -     Ambulatory Referral to Orthopedic Surgery    Chronic idiopathic constipation    Discussed dietary modifications along with over-the-counter medications to improve bowel habits.  Discussed [place] stool chart.  Recommend MiraLAX and docusate.      Return if symptoms worsen or fail to improve.    Theresa Esteves, APRN  05/20/2021

## 2021-05-22 LAB
BACTERIA UR CULT: NORMAL
BACTERIA UR CULT: NORMAL

## 2021-05-26 ENCOUNTER — TELEPHONE (OUTPATIENT)
Dept: INTERNAL MEDICINE | Facility: CLINIC | Age: 31
End: 2021-05-26

## 2021-05-26 NOTE — TELEPHONE ENCOUNTER
Caller: Lindsay Francis    Relationship: Self    Best call back number: [phone]     Caller requesting test results: PATIENT     What test was performed: URINE CULTURE     When was the test performed: 5-20-21    Where was the test performed: IN OFFICE     Additional notes: CALL WITH TEST RESULTS

## 2021-10-02 ENCOUNTER — APPOINTMENT (OUTPATIENT)
Dept: GENERAL RADIOLOGY | Facility: HOSPITAL | Age: 31
End: 2021-10-02

## 2021-10-02 ENCOUNTER — HOSPITAL ENCOUNTER (EMERGENCY)
Facility: HOSPITAL | Age: 31
Discharge: HOME | End: 2021-10-02
Attending: EMERGENCY MEDICINE | Admitting: EMERGENCY MEDICINE

## 2021-10-02 VITALS
BODY MASS INDEX: 31.65 KG/M2 | HEIGHT: 65 IN | HEART RATE: 88 BPM | TEMPERATURE: 99.1 F | DIASTOLIC BLOOD PRESSURE: 90 MMHG | SYSTOLIC BLOOD PRESSURE: 135 MMHG | OXYGEN SATURATION: 100 % | RESPIRATION RATE: 20 BRPM | WEIGHT: 190 LBS

## 2021-10-02 DIAGNOSIS — M54.41 RIGHT-SIDED LOW BACK PAIN WITH RIGHT-SIDED SCIATICA, UNSPECIFIED CHRONICITY: Primary | ICD-10-CM

## 2021-10-02 PROCEDURE — 99283 EMERGENCY DEPT VISIT LOW MDM: CPT

## 2021-10-02 PROCEDURE — 25010000002 ORPHENADRINE CITRATE PER 60 MG: Performed by: EMERGENCY MEDICINE

## 2021-10-02 PROCEDURE — 25010000002 METHYLPREDNISOLONE PER 125 MG: Performed by: EMERGENCY MEDICINE

## 2021-10-02 PROCEDURE — 72100 X-RAY EXAM L-S SPINE 2/3 VWS: CPT

## 2021-10-02 PROCEDURE — 25010000002 MORPHINE PER 10 MG: Performed by: EMERGENCY MEDICINE

## 2021-10-02 PROCEDURE — 96372 THER/PROPH/DIAG INJ SC/IM: CPT

## 2021-10-02 PROCEDURE — 25010000002 KETOROLAC TROMETHAMINE PER 15 MG: Performed by: EMERGENCY MEDICINE

## 2021-10-02 RX ORDER — ORPHENADRINE CITRATE 30 MG/ML
60 INJECTION INTRAMUSCULAR; INTRAVENOUS ONCE
Status: COMPLETED | OUTPATIENT
Start: 2021-10-02 | End: 2021-10-02

## 2021-10-02 RX ORDER — TRAMADOL HYDROCHLORIDE 50 MG/1
50 TABLET ORAL EVERY 8 HOURS PRN
Qty: 10 TABLET | Refills: 0 | Status: SHIPPED | OUTPATIENT
Start: 2021-10-02 | End: 2022-03-05

## 2021-10-02 RX ORDER — PREDNISONE 20 MG/1
20 TABLET ORAL 2 TIMES DAILY
Qty: 10 TABLET | Refills: 0 | Status: SHIPPED | OUTPATIENT
Start: 2021-10-02 | End: 2022-03-05

## 2021-10-02 RX ORDER — LIDOCAINE 50 MG/G
1 PATCH TOPICAL ONCE
Status: DISCONTINUED | OUTPATIENT
Start: 2021-10-02 | End: 2021-10-02 | Stop reason: HOSPADM

## 2021-10-02 RX ORDER — METHYLPREDNISOLONE SODIUM SUCCINATE 125 MG/2ML
80 INJECTION, POWDER, LYOPHILIZED, FOR SOLUTION INTRAMUSCULAR; INTRAVENOUS ONCE
Status: COMPLETED | OUTPATIENT
Start: 2021-10-02 | End: 2021-10-02

## 2021-10-02 RX ORDER — MORPHINE SULFATE 4 MG/ML
4 INJECTION, SOLUTION INTRAMUSCULAR; INTRAVENOUS ONCE
Status: COMPLETED | OUTPATIENT
Start: 2021-10-02 | End: 2021-10-02

## 2021-10-02 RX ORDER — KETOROLAC TROMETHAMINE 30 MG/ML
60 INJECTION, SOLUTION INTRAMUSCULAR; INTRAVENOUS ONCE
Status: COMPLETED | OUTPATIENT
Start: 2021-10-02 | End: 2021-10-02

## 2021-10-02 RX ORDER — LIDOCAINE 50 MG/G
1 PATCH TOPICAL EVERY 24 HOURS
Qty: 6 EACH | Refills: 0 | Status: SHIPPED | OUTPATIENT
Start: 2021-10-02 | End: 2022-03-05

## 2021-10-02 RX ORDER — CYCLOBENZAPRINE HCL 10 MG
10 TABLET ORAL 3 TIMES DAILY PRN
Qty: 15 TABLET | Refills: 0 | Status: SHIPPED | OUTPATIENT
Start: 2021-10-02 | End: 2022-03-05

## 2021-10-02 RX ADMIN — METHYLPREDNISOLONE SODIUM SUCCINATE 80 MG: 125 INJECTION, POWDER, FOR SOLUTION INTRAMUSCULAR; INTRAVENOUS at 15:38

## 2021-10-02 RX ADMIN — LIDOCAINE 1 PATCH: 50 PATCH TOPICAL at 15:37

## 2021-10-02 RX ADMIN — MORPHINE SULFATE 4 MG: 4 INJECTION, SOLUTION INTRAMUSCULAR; INTRAVENOUS at 15:40

## 2021-10-02 RX ADMIN — KETOROLAC TROMETHAMINE 60 MG: 30 INJECTION, SOLUTION INTRAMUSCULAR at 15:42

## 2021-10-02 RX ADMIN — ORPHENADRINE CITRATE 60 MG: 60 INJECTION INTRAMUSCULAR; INTRAVENOUS at 15:39

## 2021-10-02 NOTE — ED PROVIDER NOTES
TRIAGE CHIEF COMPLAINT:     Nursing and triage notes reviewed    Chief Complaint   Patient presents with   • Back Pain      HPI: Lindsay Francis is a 31 y.o. female who presents to the emergency department complaining of low back pain.  Patient describes several days of right-sided low back discomfort.  Pain radiates down the outside of her right leg.  Symptoms are worse with movement or if she sits in 1 position for long amount of time.  She denies any history of direct trauma.  She states she has had some issues similar to this in the past but typically the pain only goes into her buttock and not all the way down her leg.  She has tried over-the-counter medications at home but this does not seem to help her symptoms.  She denies any history of IV drug abuse or direct trauma to her back.  No bowel or bladder dysfunction.    REVIEW OF SYSTEMS: All other systems reviewed and are negative     PAST MEDICAL HISTORY:   Past Medical History:   Diagnosis Date   • Finger fracture    • GERD (gastroesophageal reflux disease)    • Kidney infection    • Urinary tract infection         FAMILY HISTORY:   Family History   Problem Relation Age of Onset   • Arthritis Mother    • Arthritis Father    • Arthritis Maternal Grandmother         SOCIAL HISTORY:   Social History     Socioeconomic History   • Marital status:      Spouse name: Not on file   • Number of children: Not on file   • Years of education: Not on file   • Highest education level: Not on file   Tobacco Use   • Smoking status: Never Smoker   • Smokeless tobacco: Never Used   Substance and Sexual Activity   • Alcohol use: Yes     Comment: rarely    • Drug use: No   • Sexual activity: Yes     Partners: Male     Birth control/protection: None        SURGICAL HISTORY:   Past Surgical History:   Procedure Laterality Date   • DENTAL PROCEDURE     • TONSILLECTOMY     • WISDOM TOOTH EXTRACTION          CURRENT MEDICATIONS:      Medication List      You have not been  prescribed any medications.          ALLERGIES: Patient has no known allergies.     PHYSICAL EXAM:   VITAL SIGNS:   Vitals:    10/02/21 1421   BP: 135/90   Pulse: 91   Resp: 18   Temp: 99.1 °F (37.3 °C)   SpO2: 100%      CONSTITUTIONAL: Awake, oriented, appears non-toxic   HENT: Atraumatic, normocephalic, oral mucosa pink and moist, airway patent. Nares patent without drainage. External ears normal.   EYES: Conjunctiva clear   NECK: Trachea midline  CARDIOVASCULAR: Normal heart rate, Normal rhythm, No murmurs, rubs, gallops   PULMONARY/CHEST: Clear to auscultation, no rhonchi, wheezes, or rales. Symmetrical breath sounds   ABDOMINAL: Non-distended, soft, non-tender - no rebound or guarding.  BACK: Slight midline tenderness along the lumbar spine, more tenderness in the paraspinal musculature on the right side.  NEUROLOGIC: Non-focal, moving all four extremities, no gross sensory or motor deficits.  No saddle anesthesia.  EXTREMITIES: No clubbing, cyanosis, or edema   SKIN: Warm, Dry, No erythema, No rash     ED COURSE / MEDICAL DECISION MAKING:   Lindsay Francis is a 31 y.o. female who presents to the emergency department for evaluation of low back discomfort.  Patient nondistressed on arrival in the emergency department.  Vital signs are stable on arrival.  Physical examination does reveal tenderness in the low back primarily in the musculature.  Did obtain x-rays which were interpreted by the radiologist and do not reveal any obvious acute abnormalities.  Patient's pain improved with therapy rendered in the emergency department.  We will continue symptomatic therapy with outpatient follow-up and return precautions.    DECISION TO DISCHARGE/ADMIT: see ED care timeline     FINAL IMPRESSION:   1 --low back pain  2 --   3 --     Electronically signed by: Ca Jaffe MD, 10/2/2021 15:05 Ca Aguilera MD  10/02/21 6903

## 2021-12-18 ENCOUNTER — HOSPITAL ENCOUNTER (EMERGENCY)
Facility: HOSPITAL | Age: 31
Discharge: HOME | End: 2021-12-18
Attending: EMERGENCY MEDICINE | Admitting: EMERGENCY MEDICINE

## 2021-12-18 ENCOUNTER — APPOINTMENT (OUTPATIENT)
Dept: GENERAL RADIOLOGY | Facility: HOSPITAL | Age: 31
End: 2021-12-18

## 2021-12-18 VITALS
RESPIRATION RATE: 16 BRPM | OXYGEN SATURATION: 99 % | WEIGHT: 180 LBS | BODY MASS INDEX: 29.99 KG/M2 | DIASTOLIC BLOOD PRESSURE: 86 MMHG | SYSTOLIC BLOOD PRESSURE: 128 MMHG | TEMPERATURE: 98.4 F | HEIGHT: 65 IN | HEART RATE: 90 BPM

## 2021-12-18 DIAGNOSIS — S61.551A: Primary | ICD-10-CM

## 2021-12-18 PROCEDURE — 99283 EMERGENCY DEPT VISIT LOW MDM: CPT

## 2021-12-18 PROCEDURE — 90715 TDAP VACCINE 7 YRS/> IM: CPT | Performed by: EMERGENCY MEDICINE

## 2021-12-18 PROCEDURE — 90471 IMMUNIZATION ADMIN: CPT | Performed by: EMERGENCY MEDICINE

## 2021-12-18 PROCEDURE — 73110 X-RAY EXAM OF WRIST: CPT

## 2021-12-18 PROCEDURE — 25010000002 TETANUS-DIPHTH-ACELL PERTUSSIS 5-2.5-18.5 LF-MCG/0.5 SUSPENSION PREFILLED SYRINGE: Performed by: EMERGENCY MEDICINE

## 2021-12-18 RX ORDER — FLUCONAZOLE 150 MG/1
150 TABLET ORAL ONCE
Qty: 1 TABLET | Refills: 0 | Status: SHIPPED | OUTPATIENT
Start: 2021-12-18 | End: 2021-12-18

## 2021-12-18 RX ORDER — BACITRACIN ZINC 500 [USP'U]/G
1 OINTMENT TOPICAL ONCE
Status: COMPLETED | OUTPATIENT
Start: 2021-12-18 | End: 2021-12-18

## 2021-12-18 RX ORDER — AMOXICILLIN AND CLAVULANATE POTASSIUM 875; 125 MG/1; MG/1
1 TABLET, FILM COATED ORAL ONCE
Status: COMPLETED | OUTPATIENT
Start: 2021-12-18 | End: 2021-12-18

## 2021-12-18 RX ORDER — AMOXICILLIN AND CLAVULANATE POTASSIUM 875; 125 MG/1; MG/1
1 TABLET, FILM COATED ORAL 2 TIMES DAILY
Qty: 14 TABLET | Refills: 0 | Status: SHIPPED | OUTPATIENT
Start: 2021-12-18 | End: 2022-03-05

## 2021-12-18 RX ADMIN — AMOXICILLIN AND CLAVULANATE POTASSIUM 1 TABLET: 875; 125 TABLET, FILM COATED ORAL at 02:27

## 2021-12-18 RX ADMIN — TETANUS TOXOID, REDUCED DIPHTHERIA TOXOID AND ACELLULAR PERTUSSIS VACCINE, ADSORBED 0.5 ML: 5; 2.5; 8; 8; 2.5 SUSPENSION INTRAMUSCULAR at 02:10

## 2021-12-18 RX ADMIN — BACITRACIN ZINC 1 APPLICATION: 500 OINTMENT TOPICAL at 02:28

## 2021-12-18 NOTE — ED PROVIDER NOTES
Subjective   History of Present Illness    Chief Complaint: Dog bite right wrist and forearm  History of Present Illness: 31-year-old female bitten by her dog when she was trying to break up a fight between her 2 pets over a toy.  Onset: Today just prior  Duration: Single inciting event with mild persistent symptoms  Exacerbating / Alleviating factors: Worse with touch and range of motion  Associated symptoms: None      Nurses Notes reviewed and agree, including vitals, allergies, social history and prior medical history.     REVIEW OF SYSTEMS: All systems reviewed and not pertinent unless noted.    Positive for: Dog bite to the right wrist and forearm    Negative for: Weakness numbness, heavy bleeding  Review of Systems    Past Medical History:   Diagnosis Date   • Finger fracture    • GERD (gastroesophageal reflux disease)    • Kidney infection    • Urinary tract infection        No Known Allergies    Past Surgical History:   Procedure Laterality Date   • DENTAL PROCEDURE     • TONSILLECTOMY     • WISDOM TOOTH EXTRACTION         Family History   Problem Relation Age of Onset   • Arthritis Mother    • Arthritis Father    • Arthritis Maternal Grandmother        Social History     Socioeconomic History   • Marital status:    Tobacco Use   • Smoking status: Never Smoker   • Smokeless tobacco: Never Used   Substance and Sexual Activity   • Alcohol use: Yes     Comment: rarely    • Drug use: No   • Sexual activity: Yes     Partners: Male     Birth control/protection: None           Objective   Physical Exam    CONSTITUTIONAL: Well developed, healthy-appearing 31-year-old  female,  in no acute distress.  VITAL SIGNS: per nursing, reviewed and noted  SKIN: exposed skin with multiple linear punctures superficial to the ulnar aspect of the right wrist and forearm.  Mild contusion.  EYES: perrla. EOMI.  ENT: Normal voice.  Patient maintained wearing a mask throughout patient encounter due to coronavirus  pandemic  RESPIRATORY:  No increased work of breathing. No retractions.   CARDIOVASCULAR:  regular rate and rhythm, no murmurs.  Good Peripheral pulses. Good cap refill to extremities.   MUSCULOSKELETAL: Mild tenderness forementioned area of punctures and contusion to the right forearm and wrist full ROM. Strength and tone grossly normal.  no spasms. no neck or back tenderness or spasm.   NEUROLOGIC: Alert, oriented x 3. No gross deficits. GCS 15.   PSYCH: appropriate affect.    Procedures     No attending physician procedures were performed on this patient.      ED Course      Right wrist x-ray interpreted by me reveals no fractures no dislocations no foreign bodies                                           MDM  31-year-old female presents for evaluation status post dog bite.  Patient received tetanus vaccine, Augmentin.  Patient discharged with prescription for Augmentin declined narcotic prescriptions.  Had local wound care by nursing staff.  X-ray without acute findings per my interpretation.  Patient did request Diflucan since she is on antibiotics.  Discharged home stable condition, outpatient follow-up return precautions discussed  Final diagnoses:   Animal bite of right wrist       ED Disposition  ED Disposition     ED Disposition Condition Comment    Discharge Stable           Theresa Esteves, APRN  [address] [facility] [address]  [facility] [address] [phone]          [facility]  [address] [address] [phone]    As needed, If symptoms worsen         Medication List      New Prescriptions    amoxicillin-clavulanate 875-125 MG per tablet  Commonly known as: AUGMENTIN  Take 1 tablet by mouth 2 (Two) Times a Day.     fluconazole 150 MG tablet  Commonly known as: DIFLUCAN  Take 1 tablet by mouth 1 (One) Time for 1 dose.           Where to Get Your Medications      These medications were sent to [id number] [place], [place] - [address] AT [facility] [address] - [phone] PH - [phone] FX  [address], [facility] [address]    Phone: [phone]   · amoxicillin-clavulanate 875-125 MG per tablet  · fluconazole 150 MG tablet          Devon Agudelo,   12/18/21 0354

## 2022-03-05 ENCOUNTER — OFFICE VISIT (OUTPATIENT)
Dept: INTERNAL MEDICINE | Facility: CLINIC | Age: 32
End: 2022-03-05

## 2022-03-05 VITALS
TEMPERATURE: 97.1 F | DIASTOLIC BLOOD PRESSURE: 84 MMHG | OXYGEN SATURATION: 99 % | SYSTOLIC BLOOD PRESSURE: 120 MMHG | WEIGHT: 184 LBS | HEART RATE: 106 BPM | HEIGHT: 65 IN | BODY MASS INDEX: 30.66 KG/M2

## 2022-03-05 DIAGNOSIS — Z13.21 SCREENING FOR ENDOCRINE, NUTRITIONAL, METABOLIC AND IMMUNITY DISORDER: ICD-10-CM

## 2022-03-05 DIAGNOSIS — Z13.228 SCREENING FOR ENDOCRINE, NUTRITIONAL, METABOLIC AND IMMUNITY DISORDER: ICD-10-CM

## 2022-03-05 DIAGNOSIS — Z13.0 SCREENING FOR ENDOCRINE, NUTRITIONAL, METABOLIC AND IMMUNITY DISORDER: ICD-10-CM

## 2022-03-05 DIAGNOSIS — Z00.00 PHYSICAL EXAM, ANNUAL: Primary | ICD-10-CM

## 2022-03-05 DIAGNOSIS — R31.9 HEMATURIA, UNSPECIFIED TYPE: ICD-10-CM

## 2022-03-05 DIAGNOSIS — Z13.29 SCREENING FOR ENDOCRINE, NUTRITIONAL, METABOLIC AND IMMUNITY DISORDER: ICD-10-CM

## 2022-03-05 DIAGNOSIS — E66.9 CLASS 1 OBESITY WITHOUT SERIOUS COMORBIDITY WITH BODY MASS INDEX (BMI) OF 30.0 TO 30.9 IN ADULT, UNSPECIFIED OBESITY TYPE: ICD-10-CM

## 2022-03-05 DIAGNOSIS — K59.04 CHRONIC IDIOPATHIC CONSTIPATION: ICD-10-CM

## 2022-03-05 LAB
BILIRUB BLD-MCNC: NEGATIVE MG/DL
CLARITY, POC: ABNORMAL
COLOR UR: YELLOW
EXPIRATION DATE: ABNORMAL
GLUCOSE UR STRIP-MCNC: NEGATIVE MG/DL
KETONES UR QL: NEGATIVE
LEUKOCYTE EST, POC: ABNORMAL
Lab: ABNORMAL
NITRITE UR-MCNC: NEGATIVE MG/ML
PH UR: 6.5 [PH] (ref 5–8)
PROT UR STRIP-MCNC: ABNORMAL MG/DL
RBC # UR STRIP: ABNORMAL /UL
SP GR UR: 1.01 (ref 1–1.03)
UROBILINOGEN UR QL: NORMAL

## 2022-03-05 PROCEDURE — 81003 URINALYSIS AUTO W/O SCOPE: CPT | Performed by: NURSE PRACTITIONER

## 2022-03-05 PROCEDURE — 99395 PREV VISIT EST AGE 18-39: CPT | Performed by: NURSE PRACTITIONER

## 2022-03-05 RX ORDER — PHENTERMINE HYDROCHLORIDE 37.5 MG/1
TABLET ORAL
COMMUNITY
Start: 2022-02-21 | End: 2022-10-27

## 2022-03-05 RX ORDER — NITROFURANTOIN 25; 75 MG/1; MG/1
100 CAPSULE ORAL 2 TIMES DAILY
Qty: 14 CAPSULE | Refills: 0 | Status: SHIPPED | OUTPATIENT
Start: 2022-03-05 | End: 2022-03-12

## 2022-03-05 NOTE — PROGRESS NOTES
Chief Complaint   Patient presents with   • Annual Exam     Urinary issues, blood in urine       Lindsay Francis is a 32 y.o. female and is here for a comprehensive physical exam. The patient reports problems - Malia is 2 and states that she has some blood in her urine denies fever chills..     History:  LMP: No LMP recorded. 2 weeks ago   Last pap date: 20  Abnormal pap? no  : 1  Para: 1  STD:none  Age of menarche:15  Sexually active:17  Abuse:none    Do you take any herbs or supplements that were not prescribed by a doctor? no  Are you taking calcium supplements? no  Are you taking aspirin daily? no      Health Habits:  Dental Exam. up to date  Eye Exam. up to date  Exercise: 3 times/week.  Current exercise activities include: walking    Health Maintenance   Topic Date Due   • ANNUAL PHYSICAL  2017   • COVID-19 Vaccine (2 - Moderna 3-dose series) 2021   • HEPATITIS C SCREENING  2023 (Originally 2016)   • INFLUENZA VACCINE  2023 (Originally 2021)   • PAP SMEAR  2023   • TDAP/TD VACCINES (2 - Td or Tdap) 2031   • Pneumococcal Vaccine 0-64  Aged Out       PMH, PSH, SocHx, FamHx, Allergies, and Medications: Reviewed and updated in the Visit Navigator.     No Known Allergies  Past Medical History:   Diagnosis Date   • Finger fracture    • GERD (gastroesophageal reflux disease)    • Kidney infection    • Urinary tract infection      Past Surgical History:   Procedure Laterality Date   • DENTAL PROCEDURE     • TONSILLECTOMY     • WISDOM TOOTH EXTRACTION       Social History     Socioeconomic History   • Marital status:    Tobacco Use   • Smoking status: Never Smoker   • Smokeless tobacco: Never Used   Substance and Sexual Activity   • Alcohol use: Yes     Comment: rarely    • Drug use: No   • Sexual activity: Yes     Partners: Male     Birth control/protection: None     Family History   Problem Relation Age of Onset   • Arthritis Mother    •  "Arthritis Father    • Arthritis Maternal Grandmother        Review of Systems  Review of Systems   Constitutional: Positive for fatigue. Negative for chills, fever, unexpected weight gain and unexpected weight loss.   HENT: Negative for congestion, ear pain, hearing loss, rhinorrhea, sinus pressure, sneezing, sore throat and trouble swallowing.    Eyes: Negative for discharge and itching.   Respiratory: Negative for cough, chest tightness and shortness of breath.    Cardiovascular: Negative for chest pain, palpitations and leg swelling.   Gastrointestinal: Negative for abdominal pain, blood in stool, constipation, diarrhea and vomiting.   Endocrine: Negative for polydipsia and polyuria.   Genitourinary: Positive for dysuria. Negative for difficulty urinating, frequency, hematuria, urgency and urinary incontinence.   Musculoskeletal: Negative for arthralgias, back pain, gait problem and joint swelling.   Skin: Negative for rash and wound.   Allergic/Immunologic: Negative for immunocompromised state.   Neurological: Negative for dizziness, syncope, weakness, light-headedness, numbness and headache.   Hematological: Does not bruise/bleed easily.   Psychiatric/Behavioral: Negative for behavioral problems, dysphoric mood and sleep disturbance. The patient is not nervous/anxious.          Vitals:    03/05/22 1222   BP: 120/84   Pulse: 106   Temp: 97.1 °F (36.2 °C)   SpO2: 99%       Objective   /84   Pulse 106   Temp 97.1 °F (36.2 °C)   Ht 165.1 cm (65\")   Wt 83.5 kg (184 lb)   SpO2 99%   BMI 30.62 kg/m²   .  Physical Exam  Vitals and nursing note reviewed.   Constitutional:       General: She is not in acute distress.     Appearance: Normal appearance. She is well-developed.   HENT:      Head: Normocephalic and atraumatic.      Right Ear: Hearing, ear canal and external ear normal. Tympanic membrane is erythematous and bulging.      Left Ear: Hearing, ear canal and external ear normal. Tympanic membrane is " erythematous and bulging.      Nose: Mucosal edema present. No rhinorrhea.      Right Sinus: No maxillary sinus tenderness or frontal sinus tenderness.      Left Sinus: No maxillary sinus tenderness or frontal sinus tenderness.      Mouth/Throat:      Mouth: Mucous membranes are dry.      Dentition: Normal dentition.      Pharynx: Posterior oropharyngeal erythema present.      Comments: PND    Eyes:      Conjunctiva/sclera: Conjunctivae normal.      Pupils: Pupils are equal, round, and reactive to light.   Neck:      Thyroid: No thyroid mass or thyromegaly.      Vascular: No carotid bruit or JVD.   Cardiovascular:      Rate and Rhythm: Normal rate and regular rhythm.      Heart sounds: Normal heart sounds, S1 normal and S2 normal. No murmur heard.  Pulmonary:      Effort: Pulmonary effort is normal. No respiratory distress.      Breath sounds: Normal breath sounds.   Abdominal:      General: Bowel sounds are normal. There is no distension or abdominal bruit.      Palpations: Abdomen is soft. There is no mass.      Tenderness: There is no abdominal tenderness.   Musculoskeletal:         General: Normal range of motion.      Cervical back: Normal range of motion and neck supple.   Lymphadenopathy:      Head:      Right side of head: No submental, submandibular or tonsillar adenopathy.      Left side of head: No submental, submandibular or tonsillar adenopathy.      Cervical: No cervical adenopathy.   Skin:     General: Skin is warm and dry.      Capillary Refill: Capillary refill takes less than 2 seconds.      Findings: No rash.      Nails: There is no clubbing.   Neurological:      Mental Status: She is alert and oriented to person, place, and time.      Cranial Nerves: No cranial nerve deficit.      Sensory: No sensory deficit.      Gait: Gait normal.   Psychiatric:         Behavior: Behavior normal.         Thought Content: Thought content normal.         Judgment: Judgment normal.              Assessment/Plan    1. Healthy female exam.    2. Patient Counseling: Including but not Limited to the following, when appropriate:  --Nutrition: Stressed importance of moderation in sodium/caffeine intake, saturated fat and cholesterol, caloric balance, sufficient intake of fresh fruits, vegetables, fiber, calcium, iron, and 1 mg of folate supplement per day (for females capable of pregnancy).  --Discussed the issue of estrogen replacement, calcium supplement, and the daily use of baby aspirin.  --Exercise: Stressed the importance of regular exercise.   --Substance Abuse: Discussed cessation/primary prevention of tobacco, alcohol, or other drug use; driving or other dangerous activities under the influence; availability of treatment for abuse, as indicated based on social history.    --Sexuality: Discussed sexually transmitted diseases, partner selection, use of condoms, avoidance of unintended pregnancy  and contraceptive alternatives.   --Injury prevention: Discussed safety belts, safety helmets, smoke detector, smoking near bedding or upholstery.   --Dental health: Discussed importance of regular tooth brushing, flossing, and dental visits.  --Immunizations reviewed.  --Discussed benefits of colon cancer screening.      3. Discussed the patient's BMI with her.  The BMI is above average; BMI management plan is completed  4. Return if symptoms worsen or fail to improve.  5. Age-appropriate Screening Scheduled      Assessment/Plan     Diagnoses and all orders for this visit:    1. Physical exam, annual (Primary)  -     Comprehensive metabolic panel  -     Lipid panel  -     CBC w AUTO Differential    2. Hematuria, unspecified type  -     POCT urinalysis dipstick, automated  -     nitrofurantoin, macrocrystal-monohydrate, (Macrobid) 100 MG capsule; Take 1 capsule by mouth 2 (Two) Times a Day for 7 days.  Dispense: 14 capsule; Refill: 0  -     Urine Culture - Urine, Urine, Clean Catch    3. Chronic idiopathic constipation    4.  Screening for endocrine, nutritional, metabolic and immunity disorder  -     Hemoglobin A1c  -     Vitamin B12  -     Vitamin D 25 hydroxy  -     TSH  -     T4, free  -     Ferritin  -     Iron and TIBC    5. Class 1 obesity without serious comorbidity with body mass index (BMI) of 30.0 to 30.9 in adult, unspecified obesity type    Patient's (Body mass index is 30.62 kg/m².) indicates that they are obese (BMI >30) with health related conditions that include none . Weight is unchanged. BMI is is above average; BMI management plan is completed. We discussed low calorie, low carb based diet program, portion control, increasing exercise, joining a fitness center or start home based exercise program and management of depression/anxiety/stress to control compensatory eating.                Theresa Esteves, APRN 03/05/2022

## 2022-03-09 LAB
BACTERIA UR CULT: NORMAL
BACTERIA UR CULT: NORMAL

## 2022-03-09 RX ORDER — FLUCONAZOLE 150 MG/1
150 TABLET ORAL ONCE
Qty: 1 TABLET | Refills: 0 | Status: SHIPPED | OUTPATIENT
Start: 2022-03-09 | End: 2022-03-09

## 2022-04-08 ENCOUNTER — LAB (OUTPATIENT)
Dept: LAB | Facility: HOSPITAL | Age: 32
End: 2022-04-08

## 2022-04-08 LAB
25(OH)D3 SERPL-MCNC: 16.2 NG/ML (ref 30–100)
ALBUMIN SERPL-MCNC: 4.7 G/DL (ref 3.5–5.2)
ALBUMIN/GLOB SERPL: 2 G/DL
ALP SERPL-CCNC: 58 U/L (ref 39–117)
ALT SERPL W P-5'-P-CCNC: 11 U/L (ref 1–33)
ANION GAP SERPL CALCULATED.3IONS-SCNC: 12 MMOL/L (ref 5–15)
AST SERPL-CCNC: 16 U/L (ref 1–32)
BASOPHILS # BLD AUTO: 0.03 10*3/MM3 (ref 0–0.2)
BASOPHILS NFR BLD AUTO: 0.6 % (ref 0–1.5)
BILIRUB SERPL-MCNC: 0.4 MG/DL (ref 0–1.2)
BUN SERPL-MCNC: 9 MG/DL (ref 6–20)
BUN/CREAT SERPL: 13.6 (ref 7–25)
CALCIUM SPEC-SCNC: 9.2 MG/DL (ref 8.6–10.5)
CHLORIDE SERPL-SCNC: 103 MMOL/L (ref 98–107)
CHOLEST SERPL-MCNC: 121 MG/DL (ref 0–200)
CO2 SERPL-SCNC: 24 MMOL/L (ref 22–29)
CREAT SERPL-MCNC: 0.66 MG/DL (ref 0.57–1)
DEPRECATED RDW RBC AUTO: 39 FL (ref 37–54)
EGFRCR SERPLBLD CKD-EPI 2021: 121.2 ML/MIN/1.73
EOSINOPHIL # BLD AUTO: 0.19 10*3/MM3 (ref 0–0.4)
EOSINOPHIL NFR BLD AUTO: 3.7 % (ref 0.3–6.2)
ERYTHROCYTE [DISTWIDTH] IN BLOOD BY AUTOMATED COUNT: 12 % (ref 12.3–15.4)
FERRITIN SERPL-MCNC: 90.8 NG/ML (ref 13–150)
GLOBULIN UR ELPH-MCNC: 2.3 GM/DL
GLUCOSE SERPL-MCNC: 99 MG/DL (ref 65–99)
HBA1C MFR BLD: 5 % (ref 4.8–5.6)
HCT VFR BLD AUTO: 41.4 % (ref 34–46.6)
HDLC SERPL-MCNC: 37 MG/DL (ref 40–60)
HGB BLD-MCNC: 13.8 G/DL (ref 12–15.9)
IMM GRANULOCYTES # BLD AUTO: 0.02 10*3/MM3 (ref 0–0.05)
IMM GRANULOCYTES NFR BLD AUTO: 0.4 % (ref 0–0.5)
IRON 24H UR-MRATE: 66 MCG/DL (ref 37–145)
IRON SATN MFR SERPL: 21 % (ref 20–50)
LDLC SERPL CALC-MCNC: 70 MG/DL (ref 0–100)
LDLC/HDLC SERPL: 1.91 {RATIO}
LYMPHOCYTES # BLD AUTO: 1.1 10*3/MM3 (ref 0.7–3.1)
LYMPHOCYTES NFR BLD AUTO: 21.2 % (ref 19.6–45.3)
MCH RBC QN AUTO: 29.8 PG (ref 26.6–33)
MCHC RBC AUTO-ENTMCNC: 33.3 G/DL (ref 31.5–35.7)
MCV RBC AUTO: 89.4 FL (ref 79–97)
MONOCYTES # BLD AUTO: 0.28 10*3/MM3 (ref 0.1–0.9)
MONOCYTES NFR BLD AUTO: 5.4 % (ref 5–12)
NEUTROPHILS NFR BLD AUTO: 3.58 10*3/MM3 (ref 1.7–7)
NEUTROPHILS NFR BLD AUTO: 68.7 % (ref 42.7–76)
NRBC BLD AUTO-RTO: 0 /100 WBC (ref 0–0.2)
PLATELET # BLD AUTO: 333 10*3/MM3 (ref 140–450)
PMV BLD AUTO: 10.5 FL (ref 6–12)
POTASSIUM SERPL-SCNC: 3.9 MMOL/L (ref 3.5–5.2)
PROT SERPL-MCNC: 7 G/DL (ref 6–8.5)
RBC # BLD AUTO: 4.63 10*6/MM3 (ref 3.77–5.28)
SODIUM SERPL-SCNC: 139 MMOL/L (ref 136–145)
T4 FREE SERPL-MCNC: 1.38 NG/DL (ref 0.93–1.7)
TIBC SERPL-MCNC: 314 MCG/DL (ref 298–536)
TRANSFERRIN SERPL-MCNC: 211 MG/DL (ref 200–360)
TRIGL SERPL-MCNC: 66 MG/DL (ref 0–150)
TSH SERPL DL<=0.05 MIU/L-ACNC: 1.02 UIU/ML (ref 0.27–4.2)
VIT B12 BLD-MCNC: 436 PG/ML (ref 211–946)
VLDLC SERPL-MCNC: 14 MG/DL (ref 5–40)
WBC NRBC COR # BLD: 5.2 10*3/MM3 (ref 3.4–10.8)

## 2022-04-08 PROCEDURE — 36415 COLL VENOUS BLD VENIPUNCTURE: CPT | Performed by: NURSE PRACTITIONER

## 2022-04-08 PROCEDURE — 83540 ASSAY OF IRON: CPT | Performed by: NURSE PRACTITIONER

## 2022-04-08 PROCEDURE — 82607 VITAMIN B-12: CPT | Performed by: NURSE PRACTITIONER

## 2022-04-08 PROCEDURE — 80050 GENERAL HEALTH PANEL: CPT | Performed by: NURSE PRACTITIONER

## 2022-04-08 PROCEDURE — 83036 HEMOGLOBIN GLYCOSYLATED A1C: CPT | Performed by: NURSE PRACTITIONER

## 2022-04-08 PROCEDURE — 82306 VITAMIN D 25 HYDROXY: CPT | Performed by: NURSE PRACTITIONER

## 2022-04-08 PROCEDURE — 84466 ASSAY OF TRANSFERRIN: CPT | Performed by: NURSE PRACTITIONER

## 2022-04-08 PROCEDURE — 82728 ASSAY OF FERRITIN: CPT | Performed by: NURSE PRACTITIONER

## 2022-04-08 PROCEDURE — 80061 LIPID PANEL: CPT | Performed by: NURSE PRACTITIONER

## 2022-04-08 PROCEDURE — 84439 ASSAY OF FREE THYROXINE: CPT | Performed by: NURSE PRACTITIONER

## 2022-04-14 RX ORDER — ERGOCALCIFEROL 1.25 MG/1
50000 CAPSULE ORAL WEEKLY
Qty: 12 CAPSULE | Refills: 0 | Status: SHIPPED | OUTPATIENT
Start: 2022-04-14 | End: 2022-07-22

## 2022-05-02 ENCOUNTER — OFFICE VISIT (OUTPATIENT)
Dept: INTERNAL MEDICINE | Facility: CLINIC | Age: 32
End: 2022-05-02

## 2022-05-02 VITALS
SYSTOLIC BLOOD PRESSURE: 138 MMHG | BODY MASS INDEX: 29.16 KG/M2 | OXYGEN SATURATION: 100 % | HEIGHT: 65 IN | HEART RATE: 92 BPM | DIASTOLIC BLOOD PRESSURE: 84 MMHG | RESPIRATION RATE: 15 BRPM | WEIGHT: 175 LBS | TEMPERATURE: 98.2 F

## 2022-05-02 DIAGNOSIS — H69.81 DYSFUNCTION OF RIGHT EUSTACHIAN TUBE: ICD-10-CM

## 2022-05-02 DIAGNOSIS — J30.9 ALLERGIC RHINITIS, UNSPECIFIED SEASONALITY, UNSPECIFIED TRIGGER: Primary | ICD-10-CM

## 2022-05-02 PROCEDURE — 99213 OFFICE O/P EST LOW 20 MIN: CPT | Performed by: NURSE PRACTITIONER

## 2022-05-02 RX ORDER — FLUTICASONE PROPIONATE 50 MCG
2 SPRAY, SUSPENSION (ML) NASAL DAILY
Qty: 15.8 ML | Refills: 1 | Status: SHIPPED | OUTPATIENT
Start: 2022-05-02 | End: 2022-08-25 | Stop reason: SDUPTHER

## 2022-05-02 RX ORDER — LORATADINE 10 MG/1
10 TABLET ORAL DAILY
Qty: 30 TABLET | Refills: 1 | Status: SHIPPED | OUTPATIENT
Start: 2022-05-02 | End: 2022-07-22 | Stop reason: SDUPTHER

## 2022-05-02 NOTE — PROGRESS NOTES
Chief Complaint / Reason:      Chief Complaint   Patient presents with   • Abnormal Lab     Check ears       Subjective     HPI  Patient presents today for follow-up on lab and would like to schedule weight loss as she does see provider regarding Adipex.  She has continued to lose weight and they have discussed weaning her back down as long as she can maintain her weight.  She also requests for her ears to be checked as she does have pain in right ear but denies fever chills.  She also would like to discuss possible allergens as she states that she was allergic to lots of stress in the past but has never been tested.  She does have hamster in the home and feels like her allergies are worse over the past few weeks she did take some allergy medicine last night but does not take it on a regular basis.    History taken from: patient    PMH/FH/Social History were reviewed and updated appropriately in the electronic medical record.   Past Medical History:   Diagnosis Date   • Finger fracture    • GERD (gastroesophageal reflux disease)    • Kidney infection    • Urinary tract infection      Past Surgical History:   Procedure Laterality Date   • DENTAL PROCEDURE     • TONSILLECTOMY     • WISDOM TOOTH EXTRACTION       Social History     Socioeconomic History   • Marital status:    Tobacco Use   • Smoking status: Never Smoker   • Smokeless tobacco: Never Used   Substance and Sexual Activity   • Alcohol use: Yes     Comment: rarely    • Drug use: No   • Sexual activity: Yes     Partners: Male     Birth control/protection: None     Family History   Problem Relation Age of Onset   • Arthritis Mother    • Arthritis Father    • Arthritis Maternal Grandmother        Review of Systems:   Review of Systems   Constitutional: Positive for fatigue. Negative for chills and fever.   HENT: Positive for postnasal drip and rhinorrhea. Negative for congestion, ear pain, hearing loss, sinus pressure, sneezing, sore throat and trouble  swallowing.    Eyes: Negative for itching.   Respiratory: Negative.  Negative for cough.    Cardiovascular: Negative.  Negative for chest pain.   Skin: Negative.    Allergic/Immunologic: Positive for environmental allergies.   Neurological: Negative.    Psychiatric/Behavioral: Negative.          All other systems were reviewed and are negative.  Exceptions are noted in the subjective or above.      Objective     Vital Signs  Vitals:    05/02/22 1730   BP: 138/84   Pulse: 92   Resp: 15   Temp: 98.2 °F (36.8 °C)   SpO2: 100%       Body mass index is 29.12 kg/m².    Physical Exam  Vitals and nursing note reviewed.   Constitutional:       General: She is not in acute distress.     Appearance: She is well-developed.   HENT:      Head: Normocephalic and atraumatic.      Right Ear: Ear canal and external ear normal. Tenderness present. Tympanic membrane is erythematous and bulging.      Left Ear: Ear canal and external ear normal. Tympanic membrane is erythematous and bulging.      Ears:      Comments: Fluid noted behind TM on right      Nose: Mucosal edema present. No rhinorrhea.      Right Sinus: No maxillary sinus tenderness or frontal sinus tenderness.      Left Sinus: No maxillary sinus tenderness or frontal sinus tenderness.      Mouth/Throat:      Mouth: Mucous membranes are dry.      Pharynx: Posterior oropharyngeal erythema present.      Comments: PND    Eyes:      Conjunctiva/sclera: Conjunctivae normal.   Cardiovascular:      Rate and Rhythm: Normal rate and regular rhythm.      Pulses: Normal pulses.      Heart sounds: Normal heart sounds.   Pulmonary:      Effort: Pulmonary effort is normal. No respiratory distress.      Breath sounds: Normal breath sounds.   Lymphadenopathy:      Head:      Right side of head: No submental, submandibular or tonsillar adenopathy.      Left side of head: No submental, submandibular or tonsillar adenopathy.      Cervical: No cervical adenopathy.   Skin:     General: Skin is warm  and dry.      Capillary Refill: Capillary refill takes less than 2 seconds.      Findings: No rash.   Neurological:      Mental Status: She is alert and oriented to person, place, and time.   Psychiatric:         Behavior: Behavior normal.         Thought Content: Thought content normal.         Judgment: Judgment normal.              Results Review:    I reviewed the patient's previous clinical results.       Medication Review:     Current Outpatient Medications:   •  phentermine (ADIPEX-P) 37.5 MG tablet, TAKE 1/2 TABLET BY MOUTH DAILY FOR 2 WEEKS. MAY INCREASE TO HALF TABLET 2 TIMES DAILY, Disp: , Rfl:   •  vitamin D (ERGOCALCIFEROL) 1.25 MG (72837 UT) capsule capsule, Take 1 capsule by mouth 1 (One) Time Per Week., Disp: 12 capsule, Rfl: 0  •  fluticasone (Flonase) 50 MCG/ACT nasal spray, 2 sprays into the nostril(s) as directed by provider Daily., Disp: 15.8 mL, Rfl: 1  •  loratadine (Claritin) 10 MG tablet, Take 1 tablet by mouth Daily., Disp: 30 tablet, Rfl: 1    Diagnoses and all orders for this visit:    Allergic rhinitis, unspecified seasonality, unspecified trigger  -     fluticasone (Flonase) 50 MCG/ACT nasal spray; 2 sprays into the nostril(s) as directed by provider Daily.  -     loratadine (Claritin) 10 MG tablet; Take 1 tablet by mouth Daily.  -     Ambulatory Referral to Allergy    Dysfunction of right eustachian tube  -     Ambulatory Referral to Allergy    Discussed nonpharmacological interventions along with worsening signs and symptoms.      Return if symptoms worsen or fail to improve.    Theresa Esteves, APRN  05/02/2022

## 2022-05-13 ENCOUNTER — HOSPITAL ENCOUNTER (EMERGENCY)
Facility: HOSPITAL | Age: 32
Discharge: HOME | End: 2022-05-14
Attending: EMERGENCY MEDICINE | Admitting: EMERGENCY MEDICINE

## 2022-05-13 DIAGNOSIS — S81.852A DOG BITE OF MULTIPLE SITES OF LEFT LOWER EXTREMITY, INITIAL ENCOUNTER: Primary | ICD-10-CM
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
## 2022-05-13 PROCEDURE — 99283 EMERGENCY DEPT VISIT LOW MDM: CPT

## 2022-05-14 ENCOUNTER — APPOINTMENT (OUTPATIENT)
Dept: GENERAL RADIOLOGY | Facility: HOSPITAL | Age: 32
End: 2022-05-14

## 2022-05-14 VITALS
WEIGHT: 171 LBS | SYSTOLIC BLOOD PRESSURE: 120 MMHG | RESPIRATION RATE: 18 BRPM | OXYGEN SATURATION: 100 % | DIASTOLIC BLOOD PRESSURE: 88 MMHG | BODY MASS INDEX: 28.49 KG/M2 | HEIGHT: 65 IN | HEART RATE: 107 BPM | TEMPERATURE: 98.2 F

## 2022-05-14 PROCEDURE — 0 LIDOCAINE 1 % SOLUTION: Performed by: PHYSICIAN ASSISTANT

## 2022-05-14 PROCEDURE — 73562 X-RAY EXAM OF KNEE 3: CPT

## 2022-05-14 RX ORDER — BACITRACIN ZINC 500 [USP'U]/G
1 OINTMENT TOPICAL ONCE
Status: COMPLETED | OUTPATIENT
Start: 2022-05-14 | End: 2022-05-14

## 2022-05-14 RX ORDER — LIDOCAINE HYDROCHLORIDE 10 MG/ML
10 INJECTION, SOLUTION INFILTRATION; PERINEURAL ONCE
Status: COMPLETED | OUTPATIENT
Start: 2022-05-14 | End: 2022-05-14

## 2022-05-14 RX ORDER — ACETAMINOPHEN 500 MG
1000 TABLET ORAL ONCE
Status: COMPLETED | OUTPATIENT
Start: 2022-05-14 | End: 2022-05-14

## 2022-05-14 RX ORDER — IBUPROFEN 800 MG/1
800 TABLET ORAL ONCE
Status: COMPLETED | OUTPATIENT
Start: 2022-05-14 | End: 2022-05-14

## 2022-05-14 RX ORDER — FLUCONAZOLE 200 MG/1
200 TABLET ORAL
Qty: 3 TABLET | Refills: 0 | Status: SHIPPED | OUTPATIENT
Start: 2022-05-14 | End: 2022-05-21

## 2022-05-14 RX ORDER — AMOXICILLIN AND CLAVULANATE POTASSIUM 875; 125 MG/1; MG/1
1 TABLET, FILM COATED ORAL 2 TIMES DAILY
Qty: 10 TABLET | Refills: 0 | Status: SHIPPED | OUTPATIENT
Start: 2022-05-14 | End: 2022-05-20

## 2022-05-14 RX ORDER — AMOXICILLIN AND CLAVULANATE POTASSIUM 875; 125 MG/1; MG/1
1 TABLET, FILM COATED ORAL ONCE
Status: COMPLETED | OUTPATIENT
Start: 2022-05-14 | End: 2022-05-14

## 2022-05-14 RX ADMIN — AMOXICILLIN AND CLAVULANATE POTASSIUM 1 TABLET: 875; 125 TABLET, FILM COATED ORAL at 03:22

## 2022-05-14 RX ADMIN — ACETAMINOPHEN 1000 MG: 500 TABLET ORAL at 01:02

## 2022-05-14 RX ADMIN — IBUPROFEN 800 MG: 800 TABLET ORAL at 01:02

## 2022-05-14 RX ADMIN — LIDOCAINE HYDROCHLORIDE 10 ML: 10 INJECTION, SOLUTION INFILTRATION; PERINEURAL at 01:02

## 2022-05-14 RX ADMIN — LIDOCAINE HYDROCHLORIDE 10 ML: 10 INJECTION, SOLUTION INFILTRATION; PERINEURAL at 01:03

## 2022-05-14 RX ADMIN — BACITRACIN ZINC 1 APPLICATION: 500 OINTMENT TOPICAL at 03:22

## 2022-05-14 NOTE — DISCHARGE INSTRUCTIONS
Take antibiotics as prescribed.  Take fluconazole as prescribed as needed for yeast infection.  Keep wounds clean.  Clean with chlorhexidine daily and place antibiotic ointment over wound.  Follow up with your PCP in 1 week for wound recheck.  Have sutures removed in 10 to 14 days.  Return to the ER for any signs of infection or new/worsening symptoms.

## 2022-05-14 NOTE — ED PROVIDER NOTES
Subjective   History of Present Illness   Patient is a 32-year-old female presenting to the ER with complaints of lacerations to her left lower extremity after being bitten by her [place].  She states that this happened just prior to arrival.  She states that she has had the dog since it was 6 weeks old and is up-to-date on vaccines.  She is not concerned about rabies.  She states that she got her tetanus shot last year so she is up-to-date on that.  She denies any additional injuries or complaints at this time.    Review of Systems   Skin: Positive for wound.   All other systems reviewed and are negative.      Past Medical History:   Diagnosis Date   • Finger fracture    • GERD (gastroesophageal reflux disease)    • Kidney infection    • Urinary tract infection        Allergies   Allergen Reactions   • Amoxil [Amoxicillin] Rash     yeast       Past Surgical History:   Procedure Laterality Date   • DENTAL PROCEDURE     • TONSILLECTOMY     • WISDOM TOOTH EXTRACTION         Family History   Problem Relation Age of Onset   • Arthritis Mother    • Arthritis Father    • Arthritis Maternal Grandmother        Social History     Socioeconomic History   • Marital status:    Tobacco Use   • Smoking status: Never Smoker   • Smokeless tobacco: Never Used   Substance and Sexual Activity   • Alcohol use: Yes     Comment: rarely    • Drug use: No   • Sexual activity: Yes     Partners: Male     Birth control/protection: None           Objective   Physical Exam  Vitals and nursing note reviewed.   Constitutional:       General: She is not in acute distress.     Appearance: She is not toxic-appearing.   HENT:      Head: Normocephalic and atraumatic.      Right Ear: External ear normal.      Left Ear: External ear normal.      Nose: Nose normal.   Eyes:      Extraocular Movements: Extraocular movements intact.      Conjunctiva/sclera: Conjunctivae normal.   Cardiovascular:      Rate and Rhythm: Tachycardia present.    Pulmonary:      Effort: Pulmonary effort is normal. No respiratory distress.   Abdominal:      General: There is no distension.      Palpations: Abdomen is soft.   Musculoskeletal:         General: Normal range of motion.      Cervical back: Normal range of motion and neck supple.   Skin:     General: Skin is warm and dry.      Comments: Patient has 5 significant lacerations to left lower extremity; 2 linear lacerations just below the knee and 1 V-shaped laceration below the knee; 1 large gaping laceration that is missing some overlying skin and adipose tissue to the left popliteal area/lateral knee and 1 below that   Neurological:      General: No focal deficit present.      Mental Status: She is alert and oriented to person, place, and time.   Psychiatric:         Mood and Affect: Mood normal.         Behavior: Behavior normal.         Laceration Repair    Date/Time: 5/14/2022 3:16 AM  Performed by: Halley Logan PA-C  Authorized by: Chevy Light MD     Consent:     Consent obtained:  Verbal    Consent given by:  Patient    Risks, benefits, and alternatives were discussed: yes      Risks discussed:  Infection, pain, poor cosmetic result, poor wound healing and need for additional repair  Universal protocol:     Patient identity confirmed:  Arm band  Anesthesia:     Anesthesia method:  Local infiltration    Local anesthetic:  Lidocaine 1% w/o epi  Laceration details:     Location:  Leg    Leg location:  L lower leg    Length (cm):  4    Depth (mm):  1  Pre-procedure details:     Preparation:  Patient was prepped and draped in usual sterile fashion  Exploration:     Hemostasis achieved with:  Direct pressure    Wound exploration: wound explored through full range of motion      Wound extent: no foreign bodies/material noted    Treatment:     Area cleansed with:  Chlorhexidine    Amount of cleaning:  Extensive    Irrigation solution:  Sterile water    Irrigation volume:  1000mL    Irrigation method:   Tap  Skin repair:     Repair method:  Sutures    Suture size:  4-0    Suture material:  Nylon    Suture technique:  Simple interrupted    Number of sutures:  7  Approximation:     Approximation:  Loose  Repair type:     Repair type:  Simple  Post-procedure details:     Dressing:  Antibiotic ointment and non-adherent dressing    Procedure completion:  Tolerated well, no immediate complications  Laceration Repair    Date/Time: 5/14/2022 3:18 AM  Performed by: Halley Logan PA-C  Authorized by: Chevy Light MD     Consent:     Consent obtained:  Verbal    Consent given by:  Patient    Risks, benefits, and alternatives were discussed: yes      Risks discussed:  Infection, need for additional repair, poor cosmetic result, pain and poor wound healing  Universal protocol:     Patient identity confirmed:  Arm band  Anesthesia:     Anesthesia method:  Local infiltration    Local anesthetic:  Lidocaine 1% w/o epi  Laceration details:     Location:  Leg    Leg location:  L lower leg    Length (cm):  3    Depth (mm):  1  Pre-procedure details:     Preparation:  Patient was prepped and draped in usual sterile fashion  Exploration:     Hemostasis achieved with:  Direct pressure    Imaging obtained: x-ray      Imaging outcome: foreign body not noted      Wound exploration: wound explored through full range of motion      Wound extent: no foreign bodies/material noted    Treatment:     Area cleansed with:  Chlorhexidine    Amount of cleaning:  Extensive    Irrigation solution:  Tap water    Irrigation volume:  1000mL    Irrigation method:  Tap  Skin repair:     Repair method:  Sutures    Suture size:  4-0    Suture technique:  Simple interrupted    Number of sutures:  3  Approximation:     Approximation:  Loose  Repair type:     Repair type:  Simple  Post-procedure details:     Dressing:  Antibiotic ointment and non-adherent dressing    Procedure completion:  Tolerated well, no immediate complications  Laceration  Repair    Date/Time: 5/14/2022 3:19 AM  Performed by: Halley Logan PA-C  Authorized by: Chevy Light MD     Consent:     Consent obtained:  Verbal    Consent given by:  Patient    Risks, benefits, and alternatives were discussed: yes      Risks discussed:  Infection, need for additional repair, poor cosmetic result, poor wound healing and pain  Universal protocol:     Patient identity confirmed:  Verbally with patient  Anesthesia:     Anesthesia method:  Local infiltration    Local anesthetic:  Lidocaine 1% w/o epi  Laceration details:     Location:  Leg    Leg location:  L lower leg    Length (cm):  2    Depth (mm):  1  Pre-procedure details:     Preparation:  Patient was prepped and draped in usual sterile fashion  Exploration:     Hemostasis achieved with:  Direct pressure    Imaging obtained: x-ray      Imaging outcome: foreign body not noted      Wound exploration: wound explored through full range of motion      Wound extent: no foreign bodies/material noted    Treatment:     Area cleansed with:  Chlorhexidine    Amount of cleaning:  Extensive    Irrigation solution:  Sterile water    Irrigation volume:  1000mL    Irrigation method:  Tap  Skin repair:     Repair method:  Sutures    Suture size:  4-0    Suture technique:  Simple interrupted    Number of sutures:  2  Approximation:     Approximation:  Close  Repair type:     Repair type:  Simple  Post-procedure details:     Procedure completion:  Tolerated well, no immediate complications  Laceration Repair    Date/Time: 5/14/2022 3:20 AM  Performed by: Halley Logan PA-C  Authorized by: Chevy Light MD     Consent:     Consent obtained:  Verbal    Consent given by:  Patient    Risks, benefits, and alternatives were discussed: yes      Risks discussed:  Infection, poor cosmetic result, poor wound healing, pain and need for additional repair  Universal protocol:     Patient identity confirmed:  Verbally with patient  Anesthesia:      Anesthesia method:  Local infiltration    Local anesthetic:  Lidocaine 1% w/o epi  Laceration details:     Location:  Leg    Leg location:  L lower leg    Length (cm):  7    Depth (mm):  5  Pre-procedure details:     Preparation:  Patient was prepped and draped in usual sterile fashion  Exploration:     Hemostasis achieved with:  Direct pressure    Imaging obtained: x-ray      Imaging outcome: foreign body not noted      Wound exploration: wound explored through full range of motion      Wound extent: no foreign bodies/material noted    Treatment:     Area cleansed with:  Chlorhexidine    Amount of cleaning:  Extensive    Irrigation solution:  Sterile water    Irrigation volume:  1000mL    Irrigation method:  Tap  Skin repair:     Repair method:  Sutures    Suture size:  4-0    Suture material:  Nylon    Suture technique:  Horizontal mattress    Number of sutures:  8  Approximation:     Approximation:  Loose  Repair type:     Repair type:  Intermediate  Post-procedure details:     Dressing:  Antibiotic ointment and non-adherent dressing    Procedure completion:  Tolerated well, no immediate complications  Laceration Repair    Date/Time: 5/14/2022 3:22 AM  Performed by: Halley Logan PA-C  Authorized by: Chevy Light MD     Consent:     Consent obtained:  Verbal    Consent given by:  Patient    Risks, benefits, and alternatives were discussed: yes      Risks discussed:  Infection, need for additional repair, poor cosmetic result, pain and poor wound healing  Universal protocol:     Patient identity confirmed:  Arm band  Anesthesia:     Anesthesia method:  Local infiltration    Local anesthetic:  Lidocaine 1% w/o epi  Laceration details:     Location:  Leg    Leg location:  L lower leg    Length (cm):  3    Depth (mm):  5  Pre-procedure details:     Preparation:  Patient was prepped and draped in usual sterile fashion  Exploration:     Hemostasis achieved with:  Direct pressure    Imaging obtained: x-ray       Imaging outcome: foreign body not noted      Wound exploration: wound explored through full range of motion      Wound extent: no foreign bodies/material noted    Treatment:     Area cleansed with:  Chlorhexidine    Amount of cleaning:  Extensive    Irrigation solution:  Sterile water    Irrigation volume:  1000mL    Irrigation method:  Tap  Skin repair:     Repair method:  Sutures    Suture size:  4-0    Suture material:  Nylon    Suture technique:  Horizontal mattress    Number of sutures:  3  Approximation:     Approximation:  Loose  Repair type:     Repair type:  Simple  Post-procedure details:     Dressing:  Antibiotic ointment and non-adherent dressing    Procedure completion:  Tolerated well, no immediate complications               ED Course                                                 MDM  Patient was evaluated in the ER for multiple lacerations to her left lower extremity from a dog bite.  She is hemodynamically stable, nontoxic-appearing on exam.  She does have multiple open lacerations to her left lower extremity.  Tetanus is up-to-date.  She states she got it last year.  She is not concerned about rabies as it is her personal dog that she has had since it was 6 weeks old is up-to-date on vaccines.  Lacerations were cleaned extensively with chlorhexidine surgical scrub and irrigated well with sterile water.  Sutures were placed and wounds were closed loosely.  There was some difficulty due to missing adipose tissue and skin, large gaping wound to the popliteal region/lateral lower extremity. Mattress sutures were used due to large amount of tension on wound. Patient tolerated the procedure well.  She was advised on wound care.  Bacitracin was placed over the wounds.  She was given a prescription for Augmentin with first dose given in the ER.  Patient states that she always gets yeast infections with antibiotics especially Augmentin.  She was given a prescription for fluconazole to take if needed  for a yeast infection.  She was advised to follow up with her PCP in 1 week for wound recheck.  She was advised that she should have the sutures removed in 10 to 14 days.  She was advised that she could possibly need wound revision in the future if there is difficulty with healing.  Precautions were given for return to the ER for any new or worsening symptoms.    Final diagnoses:   Dog bite of multiple sites of left lower extremity, initial encounter       ED Disposition  ED Disposition     ED Disposition   Discharge    Condition   Stable    Comment   --             Theresa Esteves, APRROSEMARY  [address]  [facility] [address] [phone]    Schedule an appointment as soon as possible for a visit in 1 week  For wound re-check, For suture removal    [facility] Emergency Department  [address] [address] [phone]  Go to   As needed, If symptoms worsen         Medication List      New Prescriptions    amoxicillin-clavulanate 875-125 MG per tablet  Commonly known as: AUGMENTIN  Take 1 tablet by mouth 2 (Two) Times a Day for 5 days.     fluconazole 200 MG tablet  Commonly known as: DIFLUCAN  Take 1 tablet by mouth Every 72 (Seventy-Two) Hours for 3 doses.           Where to Get Your Medications      These medications were sent to [id number] - [place], [place] - [address] AT [facility] [address] - [phone] PH - [phone] FX  [address], [facility] [address]    Phone: [phone]   · amoxicillin-clavulanate 875-125 MG per tablet  · fluconazole 200 MG tablet          Halley Logan PA-C  05/14/22 0322

## 2022-05-20 ENCOUNTER — OFFICE VISIT (OUTPATIENT)
Dept: INTERNAL MEDICINE | Facility: CLINIC | Age: 32
End: 2022-05-20

## 2022-05-20 VITALS
HEIGHT: 65 IN | HEART RATE: 112 BPM | SYSTOLIC BLOOD PRESSURE: 124 MMHG | BODY MASS INDEX: 28.12 KG/M2 | TEMPERATURE: 98.4 F | WEIGHT: 168.8 LBS | DIASTOLIC BLOOD PRESSURE: 86 MMHG | OXYGEN SATURATION: 98 %

## 2022-05-20 DIAGNOSIS — L08.9 DOG BITE OF LEFT LOWER LEG WITH INFECTION, SEQUELA: Primary | ICD-10-CM

## 2022-05-20 DIAGNOSIS — S81.852S DOG BITE OF LEFT LOWER LEG WITH INFECTION, SEQUELA: Primary | ICD-10-CM
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
## 2022-05-20 DIAGNOSIS — T14.8XXD DELAYED WOUND HEALING: ICD-10-CM

## 2022-05-20 PROCEDURE — 99214 OFFICE O/P EST MOD 30 MIN: CPT | Performed by: NURSE PRACTITIONER

## 2022-05-20 RX ORDER — AMOXICILLIN AND CLAVULANATE POTASSIUM 875; 125 MG/1; MG/1
1 TABLET, FILM COATED ORAL 2 TIMES DAILY
Qty: 20 TABLET | Refills: 0 | Status: SHIPPED | OUTPATIENT
Start: 2022-05-20 | End: 2022-05-30

## 2022-05-20 RX ORDER — FLUCONAZOLE 150 MG/1
TABLET ORAL
Qty: 2 TABLET | Refills: 0 | Status: SHIPPED | OUTPATIENT
Start: 2022-05-20 | End: 2022-08-25

## 2022-05-20 NOTE — PROGRESS NOTES
"  Office Visit      Patient Name: Lindsay Francis  : 1990   MRN: [id number]   Care Team: Patient Care Team:  Theresa Esteves APRN as PCP - General (Family Medicine)    Chief Complaint  ER follow up  (Seen on 2022, per patient their dog was not fully vaccinated she had no concerns about the dog having rabies due to him being a house dog. This is the third time that their dog had bitten her, they did put their dog down. The bite is on her left leg. ) and Animal Bite    Subjective     Subjective      Lindsay Francis presents to [facility] for ER ffollow-up.   Sustained a dog bite 1 week ago from her family dog on the left lower leg.   She required several sutures in the ED and advised to follow-up for wound check in 1 week.   She was given a 5 day course of augmentin and has finished the antibiotics. When she stopped the augmentin she began experiencing some worsening warmth around the posterior site and greenish drainage from the wound.   States the wound was cleaned prior to sutures being placed but was difficult to place due to the extensive bite.   She has not had any fever, chills, nausea, vomiting, or skin color changes of the foot.   She does have some numbness near the bite that is not new. Feels like her foot is cool at times but right now it feels similar to the other.     Objective     Objective   Vital Signs:   /86   Pulse 112   Temp 98.4 °F (36.9 °C) (Temporal)   Ht 165.1 cm (65\")   Wt 76.6 kg (168 lb 12.8 oz)   SpO2 98%   BMI 28.09 kg/m²     Physical Exam  Vitals and nursing note reviewed.   Constitutional:       General: She is not in acute distress.     Appearance: Normal appearance. She is not ill-appearing or toxic-appearing.   Eyes:      Pupils: Pupils are equal, round, and reactive to light.   Cardiovascular:      Rate and Rhythm: Regular rhythm. Tachycardia present.      Heart sounds: Normal heart sounds. No murmur " heard.  Pulmonary:      Effort: Pulmonary effort is normal. No respiratory distress.      Breath sounds: Normal breath sounds. No wheezing.   Musculoskeletal:      Comments: Decreased ROM left knee     Skin:     General: Skin is warm and dry.      Findings: Bruising (diffuse over left knee, anterior and posteriorly) and wound (see photo below. Sutures intact without drainage today. Posterior site appears erythematous and warm to the touch. ) present. No rash.          Neurological:      General: No focal deficit present.      Mental Status: She is alert.   Psychiatric:         Mood and Affect: Mood normal.         Behavior: Behavior normal.              Assessment / Plan      Assessment & Plan   Problem List Items Addressed This Visit    None     Visit Diagnoses     Dog bite of left lower leg with infection, sequela    -  Primary    Relevant Medications    amoxicillin-clavulanate (Augmentin) 875-125 MG per tablet    Other Relevant Orders    Ambulatory Referral to Plastic Surgery    Delayed wound healing        Relevant Orders    Ambulatory Referral to Plastic Surgery    Extend augmentin for 10 days for presumed infection of posterior wound site. Referral to plastic surgery placed due to delayed healing and extensive wound. Diflucan as needed for yeast as she does get vaginitis with antibiotics. Continue keeping wound clean, dry, and covered. Educated on s/s of compartment syndrome and worsening infection, ER with any of these symptoms. Needs to follow-up for suture removal within 7 days.         I spent 35 minutes caring for Lindsay on this date of service. This time includes time spent by me in the following activities:preparing for the visit, reviewing tests, obtaining and/or reviewing a separately obtained history, performing a medically appropriate examination and/or evaluation , counseling and educating the patient/family/caregiver, ordering medications, tests, or procedures and documenting information in the  medical record         Follow Up   Return if symptoms worsen or fail to improve.  Patient was given instructions and counseling regarding her condition or for health maintenance advice. Please see specific information pulled into the AVS if appropriate.     JULIO C Casey  [facility]

## 2022-07-22 ENCOUNTER — OFFICE VISIT (OUTPATIENT)
Dept: INTERNAL MEDICINE | Facility: CLINIC | Age: 32
End: 2022-07-22

## 2022-07-22 VITALS
SYSTOLIC BLOOD PRESSURE: 125 MMHG | TEMPERATURE: 98.4 F | HEART RATE: 77 BPM | WEIGHT: 165 LBS | BODY MASS INDEX: 27.49 KG/M2 | DIASTOLIC BLOOD PRESSURE: 77 MMHG | RESPIRATION RATE: 16 BRPM | OXYGEN SATURATION: 100 % | HEIGHT: 65 IN

## 2022-07-22 DIAGNOSIS — Z76.89 ENCOUNTER FOR WEIGHT MANAGEMENT: Primary | ICD-10-CM

## 2022-07-22 DIAGNOSIS — J30.9 ALLERGIC RHINITIS, UNSPECIFIED SEASONALITY, UNSPECIFIED TRIGGER: ICD-10-CM

## 2022-07-22 PROCEDURE — 99213 OFFICE O/P EST LOW 20 MIN: CPT | Performed by: NURSE PRACTITIONER

## 2022-07-22 RX ORDER — LORATADINE 10 MG/1
10 TABLET ORAL DAILY
Qty: 30 TABLET | Refills: 1 | Status: SHIPPED | OUTPATIENT
Start: 2022-07-22 | End: 2022-08-25

## 2022-08-25 ENCOUNTER — OFFICE VISIT (OUTPATIENT)
Dept: INTERNAL MEDICINE | Facility: CLINIC | Age: 32
End: 2022-08-25

## 2022-08-25 VITALS
BODY MASS INDEX: 27.82 KG/M2 | RESPIRATION RATE: 15 BRPM | DIASTOLIC BLOOD PRESSURE: 70 MMHG | WEIGHT: 167 LBS | OXYGEN SATURATION: 100 % | SYSTOLIC BLOOD PRESSURE: 114 MMHG | HEIGHT: 65 IN | HEART RATE: 109 BPM | TEMPERATURE: 97.3 F

## 2022-08-25 DIAGNOSIS — Z78.9 EDUCATED ABOUT MANAGEMENT OF WEIGHT: Primary | ICD-10-CM

## 2022-08-25 DIAGNOSIS — J30.9 ALLERGIC RHINITIS, UNSPECIFIED SEASONALITY, UNSPECIFIED TRIGGER: ICD-10-CM

## 2022-08-25 PROCEDURE — 99213 OFFICE O/P EST LOW 20 MIN: CPT | Performed by: NURSE PRACTITIONER

## 2022-08-25 RX ORDER — ALBUTEROL SULFATE 90 UG/1
AEROSOL, METERED RESPIRATORY (INHALATION)
COMMUNITY
Start: 2022-08-16

## 2022-08-25 RX ORDER — AZELASTINE 1 MG/ML
SPRAY, METERED NASAL
COMMUNITY
Start: 2022-08-16

## 2022-08-25 RX ORDER — FEXOFENADINE HYDROCHLORIDE 180 MG/1
180 TABLET, FILM COATED ORAL DAILY
COMMUNITY
Start: 2022-08-16 | End: 2022-12-13

## 2022-08-25 RX ORDER — FLUTICASONE PROPIONATE 50 MCG
2 SPRAY, SUSPENSION (ML) NASAL DAILY
Qty: 15.8 ML | Refills: 1 | Status: SHIPPED | OUTPATIENT
Start: 2022-08-25 | End: 2022-12-13

## 2022-09-11 NOTE — PROGRESS NOTES
"  Chief Complaint / Reason:      Chief Complaint   Patient presents with   • Weight Check       Subjective     HPI  Patient presents today for weight check I would like to discuss medications.  She has been taking of and her BMI is 27.7.  She denies any side effects with current medication regimen.  Patient complains about allergy but denies fever or chills.  Denies chest pain, shortness of breath or heart palpitations.  History taken from: patient    PMH/FH/Social History were reviewed and updated appropriately in the electronic medical record.   Past Medical History:   Diagnosis Date   • Finger fracture    • GERD (gastroesophageal reflux disease)    • Kidney infection    • Urinary tract infection      Past Surgical History:   Procedure Laterality Date   • DENTAL PROCEDURE     • TONSILLECTOMY     • WISDOM TOOTH EXTRACTION       Social History     Socioeconomic History   • Marital status:    Tobacco Use   • Smoking status: Never Smoker   • Smokeless tobacco: Never Used   Vaping Use   • Vaping Use: Never used   Substance and Sexual Activity   • Alcohol use: Yes     Comment: rarely    • Drug use: No   • Sexual activity: Yes     Partners: Male     Birth control/protection: None     Family History   Problem Relation Age of Onset   • Arthritis Mother    • Arthritis Father    • Arthritis Maternal Grandmother        Review of Systems:   Review of Systems      All other systems were reviewed and are negative.  Exceptions are noted in the subjective or above.      Objective     Vitals:    08/25/22 1723 08/25/22 1725   BP: 146/96 114/70   Pulse: 109    Resp: 15    Temp: 97.3 °F (36.3 °C)    SpO2: 100%    Weight: 75.8 kg (167 lb)    Height: 165.1 cm (65\")        Body mass index is 27.79 kg/m².    Physical Exam  Vitals and nursing note reviewed.   Constitutional:       General: She is not in acute distress.     Appearance: She is well-developed.   Cardiovascular:      Rate and Rhythm: Regular rhythm. Tachycardia present. "      Pulses: Normal pulses.      Heart sounds: Normal heart sounds.   Pulmonary:      Effort: Pulmonary effort is normal.      Breath sounds: Normal breath sounds. No wheezing.   Chest:      Chest wall: No tenderness.   Skin:     General: Skin is warm and dry.      Capillary Refill: Capillary refill takes less than 2 seconds.      Coloration: Skin is not pale.      Findings: No erythema or rash.   Neurological:      Mental Status: She is alert and oriented to person, place, and time.   Psychiatric:         Behavior: Behavior normal.         Thought Content: Thought content normal.         Judgment: Judgment normal.            Results Review:    I reviewed the patient's previous clinical results.       Medication Review:     Current Outpatient Medications:   •  albuterol sulfate  (90 Base) MCG/ACT inhaler, , Disp: , Rfl:   •  Allergy Relief 180 MG tablet, Take 180 mg by mouth Daily., Disp: , Rfl:   •  azelastine (ASTELIN) 0.1 % nasal spray, INSTILL 1-2 SPRAYS IN EACH NOSRIL TWICE DAILY AS NEEDED. OK TO USE EVERY DAY, Disp: , Rfl:   •  fluticasone (Flonase) 50 MCG/ACT nasal spray, 2 sprays into the nostril(s) as directed by provider Daily., Disp: 15.8 mL, Rfl: 1  •  phentermine (ADIPEX-P) 37.5 MG tablet, 0.5 am and pm qod, Disp: , Rfl:     Diagnoses and all orders for this visit:    Educated about management of weight    Allergic rhinitis, unspecified seasonality, unspecified trigger  -     fluticasone (Flonase) 50 MCG/ACT nasal spray; 2 sprays into the nostril(s) as directed by provider Daily.    Other orders  -     azelastine (ASTELIN) 0.1 % nasal spray; INSTILL 1-2 SPRAYS IN EACH NOSRIL TWICE DAILY AS NEEDED. OK TO USE EVERY DAY  -     albuterol sulfate  (90 Base) MCG/ACT inhaler  -     Allergy Relief 180 MG tablet; Take 180 mg by mouth Daily.          Return if symptoms worsen or fail to improve.    Theresa Esteves, APRN  08/25/2022

## 2023-06-06 ENCOUNTER — OFFICE VISIT (OUTPATIENT)
Dept: INTERNAL MEDICINE | Facility: CLINIC | Age: 33
End: 2023-06-06
Payer: COMMERCIAL

## 2023-06-06 VITALS
TEMPERATURE: 98.5 F | WEIGHT: 196 LBS | BODY MASS INDEX: 32.65 KG/M2 | HEIGHT: 65 IN | DIASTOLIC BLOOD PRESSURE: 82 MMHG | SYSTOLIC BLOOD PRESSURE: 122 MMHG | OXYGEN SATURATION: 98 % | HEART RATE: 91 BPM

## 2023-06-06 DIAGNOSIS — J06.9 ACUTE URI: Primary | ICD-10-CM

## 2023-06-06 LAB
EXPIRATION DATE: NORMAL
EXPIRATION DATE: NORMAL
FLUAV AG UPPER RESP QL IA.RAPID: NOT DETECTED
FLUBV AG UPPER RESP QL IA.RAPID: NOT DETECTED
INTERNAL CONTROL: NORMAL
INTERNAL CONTROL: NORMAL
Lab: NORMAL
Lab: NORMAL
S PYO AG THROAT QL: NEGATIVE
SARS-COV-2 AG UPPER RESP QL IA.RAPID: NOT DETECTED

## 2023-06-06 PROCEDURE — 99213 OFFICE O/P EST LOW 20 MIN: CPT | Performed by: NURSE PRACTITIONER

## 2023-06-06 PROCEDURE — 87880 STREP A ASSAY W/OPTIC: CPT | Performed by: NURSE PRACTITIONER

## 2023-06-06 PROCEDURE — 87428 SARSCOV & INF VIR A&B AG IA: CPT | Performed by: NURSE PRACTITIONER

## 2023-06-06 RX ORDER — DEXTROMETHORPHAN HYDROBROMIDE AND PROMETHAZINE HYDROCHLORIDE 15; 6.25 MG/5ML; MG/5ML
5 SYRUP ORAL 4 TIMES DAILY PRN
Qty: 240 ML | Refills: 0 | Status: SHIPPED | OUTPATIENT
Start: 2023-06-06

## 2023-06-06 NOTE — PROGRESS NOTES
"  Office Visit      Patient Name: Lindsay Francis  : 1990   MRN: [id number]   Care Team: Patient Care Team:  Theresa Esteves APRN as PCP - General (Family Medicine)    Chief Complaint  Nasal Congestion (Cough/Started  ) and Sore Throat (Low grade fever)    Subjective     Subjective      Lindsay Francis presents to [facility] for URI symptoms.     Endorses sore throat, congestion, headache, and productive cough.     States her mucus is thick and green. Cough is worse when she lays flat.     Symptoms have been going since .     She has taken tylenol and Mucinex for her symptoms with no improvement .     Denies shortness of breath, chest pain, chills, fever, nausea, vomiting, diarrhea, or constipation.     Reports sick exposure at home and states he  has similar symptoms.     Objective     Objective   Vital Signs:   /82   Pulse 91   Temp 98.5 °F (36.9 °C)   Ht 165.1 cm (65\")   Wt 88.9 kg (196 lb)   SpO2 98%   BMI 32.62 kg/m²     Physical Exam  Vitals and nursing note reviewed.   Constitutional:       Appearance: Normal appearance.   HENT:      Right Ear: Tenderness present. Tympanic membrane is bulging. Tympanic membrane is not erythematous.      Left Ear: Tenderness present. Tympanic membrane is bulging. Tympanic membrane is not erythematous.      Nose: Congestion and rhinorrhea present.      Right Sinus: No maxillary sinus tenderness or frontal sinus tenderness.      Left Sinus: No maxillary sinus tenderness or frontal sinus tenderness.      Mouth/Throat:      Mouth: Mucous membranes are moist.      Pharynx: Posterior oropharyngeal erythema (PND) present.   Eyes:      Conjunctiva/sclera: Conjunctivae normal.   Cardiovascular:      Rate and Rhythm: Normal rate and regular rhythm.      Heart sounds: Normal heart sounds.   Pulmonary:      Effort: Pulmonary effort is normal.      Breath sounds: Normal breath sounds.   Musculoskeletal:     "  Cervical back: Neck supple.   Lymphadenopathy:      Cervical: Cervical adenopathy present.   Skin:     General: Skin is warm and dry.   Neurological:      General: No focal deficit present.      Mental Status: She is alert.   Psychiatric:         Mood and Affect: Mood normal.        Assessment / Plan      Assessment & Plan   Problem List Items Addressed This Visit    None  Visit Diagnoses       Acute URI    -  Primary    Relevant Orders    POCT SARS-CoV-2 Antigen FABI (Completed)    POCT rapid strep A (Completed)    Educated symptoms are likely viral and self limiting, will resolve without antibiotics. Recommend OTC decongestant such as stahist, promethazine-dm for bothersome cough at night-cautioned on drowsiness, tylenol/ibuprofen PRN, increase water intake, flonase, and rest. Return with worsening or persisting symptoms despite conservative measures.               This note accurately reflects work and decisions made by me.      Follow Up   Return if symptoms worsen or fail to improve.  Patient was given instructions and counseling regarding her condition or for health maintenance advice. Please see specific information pulled into the AVS if appropriate.     JULIO C Casey  [facility]

## 2023-06-14 ENCOUNTER — NURSE TRIAGE (OUTPATIENT)
Dept: CALL CENTER | Facility: HOSPITAL | Age: 33
End: 2023-06-14
Payer: COMMERCIAL

## 2023-06-14 NOTE — TELEPHONE ENCOUNTER
"Reason for Disposition   [1] Blurred vision or visual changes AND [2] present now AND [3] sudden onset or new (e.g., minutes, hours, days)  (Exception: Seeing floaters / black specks OR previously diagnosed migraine headaches with same symptoms.)    Additional Information   Negative: Weakness of the face, arm or leg on one side of the body   Negative: Followed getting substance in the eye   Negative: Foreign body stuck in the eye   Negative: Followed an eye injury   Negative: Followed sun lamp or sun exposure (UV keratitis)   Negative: Yellow or green discharge (pus) in the eye   Negative: Pregnant   Negative: Postpartum (from 0 to 6 weeks after delivery)   Negative: Complete loss of vision in one or both eyes   Negative: SEVERE eye pain   Negative: SEVERE headache   Negative: Double vision    Answer Assessment - Initial Assessment Questions  1. DESCRIPTION: \"How has your vision changed?\" (e.g., complete vision loss, blurred vision, double vision, floaters, etc.)      Today after taking her Doxycycline this morning, vision is\" fuzzish\", was blotchy yesterday  2. LOCATION: \"One or both eyes?\" If one, ask: \"Which eye?\"      Both eyes  3. SEVERITY: \"Can you see anything?\" If Yes, ask: \"What can you see?\" (e.g., fine print)      Vision is \"Fuzzyish\"  4. ONSET: \"When did this begin?\" \"Did it start suddenly or has this been gradual?\"      Started yesterday evening, started suddenly  5. PATTERN: \"Does this come and go, or has it been constant since it started?\"      Comes and goes  6. PAIN: \"Is there any pain in your eye(s)?\"  (Scale 1-10; or mild, moderate, severe)    - NONE (0): No pain.    - MILD (1-3): Doesn't interfere with normal activities.    - MODERATE (4-7): Interferes with normal activities or awakens from sleep.     - SEVERE (8-10): Excruciating pain, unable to do any normal activities.      no  7. CONTACTS-GLASSES: \"Do you wear contacts or glasses?\"      Glasses for computer work   8. CAUSE: \"What do you think " "is causing this visual problem?\"      Thinks it is related to Doxycycline  9. OTHER SYMPTOMS: \"Do you have any other symptoms?\" (e.g., confusion, headache, arm or leg weakness, speech problems)      Headache URI   10. PREGNANCY: \"Is there any chance you are pregnant?\" \"When was your last menstrual period?\"        NO    Protocols used: Vision Loss or Change-ADULT-AH    "

## 2023-06-14 NOTE — TELEPHONE ENCOUNTER
Caller concerned about vision problems. Seen in  placed on Doxycyline and an eye drop. States her vision was blotchy yesterday and resolved. After taking her Doxycycline this morning she vision is fuzzy. Reviewed guideline with caller, advises evaluation in ED. Caller reluctant to go to ED. Warm Transfer to [facility] in [facility]

## 2023-06-15 ENCOUNTER — TELEPHONE (OUTPATIENT)
Dept: INTERNAL MEDICINE | Facility: CLINIC | Age: 33
End: 2023-06-15
Payer: COMMERCIAL

## 2023-06-15 NOTE — TELEPHONE ENCOUNTER
Ann Marie PAC called to inform me that the [facility] called to say that the patient called wanting in to see Carolyn due to antibiotic not working, per Ann Marie patient was upset due to Carolyn having no openings today. I attempted to call patient back at [phone] phone went to a voicemail that was not active.

## 2023-10-05 ENCOUNTER — OFFICE VISIT (OUTPATIENT)
Dept: OBSTETRICS AND GYNECOLOGY | Facility: CLINIC | Age: 33
End: 2023-10-05
Payer: COMMERCIAL

## 2023-10-05 VITALS
WEIGHT: 204 LBS | BODY MASS INDEX: 33.99 KG/M2 | SYSTOLIC BLOOD PRESSURE: 118 MMHG | DIASTOLIC BLOOD PRESSURE: 76 MMHG | HEIGHT: 65 IN

## 2023-10-05 DIAGNOSIS — Z30.017 ENCOUNTER FOR INITIAL PRESCRIPTION OF IMPLANTABLE SUBDERMAL CONTRACEPTIVE: ICD-10-CM

## 2023-10-05 DIAGNOSIS — Z12.4 SCREENING FOR CERVICAL CANCER: ICD-10-CM

## 2023-10-05 DIAGNOSIS — Z01.419 ENCOUNTER FOR GYNECOLOGICAL EXAMINATION WITHOUT ABNORMAL FINDING: Primary | ICD-10-CM

## 2023-10-05 NOTE — PROGRESS NOTES
"Subjective   Chief Complaint   Patient presents with    Gynecologic Exam     Last pap 20 WNL, want to discuss birth control options.     Lindsay Francis is a 33 y.o. year old  presenting to be seen for her annual exam.   Her periods are monthly lasting 4-5 days.  She occasionally uses condoms. Not trying for pregnancy.  She is interested in birth control. Her son is 13.  She doesn't do well with pills. Her sister got pregnant with IUD in place. She is interested in Nexplanon.    Past Medical History:   Diagnosis Date    Finger fracture     GERD (gastroesophageal reflux disease)     Kidney infection     Urinary tract infection       Past Surgical History:   Procedure Laterality Date    DENTAL PROCEDURE      TONSILLECTOMY      WISDOM TOOTH EXTRACTION            The following portions of the patient's history were reviewed and updated as appropriate:problem list, current medications, allergies, past family history, past medical history, past social history and past surgical history.    Review of Systems   Constitutional: Negative.    Respiratory: Negative.     Cardiovascular: Negative.    Gastrointestinal: Negative.    Genitourinary: Negative.    Musculoskeletal: Negative.    Neurological: Negative.    Psychiatric/Behavioral: Negative.     All other systems reviewed and are negative.        Objective   /76   Ht 165.1 cm (65\")   Wt 92.5 kg (204 lb)   LMP 2023   BMI 33.95 kg/m²     Physical Exam   Constitutional   The patient is awake, alert, well developed, well nourished and well groomed.   Neck   The neck is supple and the trachea is midline. The thyroid is not enlarged and there are no palpable nodules.   Breast  Inspection of the breast reveals symmetrical and smooth breast bilaterally without skin color changes, lesions, dimpling or skin retraction.  The nipples are  everted and without discharge.  Palpation of the breast tissue is non-tender, smooth, without masses.  The axillae " are without masses.   Respiratory  The patient is relaxed and breathes without effort. Lungs CTAB  Cardiovascular  Heart regular rate and rhythm without murmur.  Gastrointestinal   The abdomen is soft and non-tender.  No hepatosplenomegaly.  Genitourinary   - External Genitalia without erythema, lesions, or masses  -Urethral Meatus is without erythema, edema, prolapse or lesions.   -Bladder - Palpation at the region above the symphysis pubis             reveals no bladder tenderness.   -Vagina - There is no excessive vaginal discharge.   Vaginal walls reveals moist vaginal mucosa without inflammation or lesions    There is no evidence of pelvic relaxation; there is no cystocele or rectocele.  -Cervix  is smooth, pink, and without discharge. Negative cervical motion tenderness   Uterus - uterine body is of normal size, shape, & without tenderness  Adnexa structures are nontender and without masses  Perineum is without inflammation or lesions   Extremities  Full ROM. No cyanosis or edema   Skin  Normal in color, texture, moisture, temperature, mobility and turgor. There are no abnormal lesions.    Psychiatric  The patient is oriented to person, place, and time. Speech is fluent and words are clear          Assessment /Plan      Diagnoses and all orders for this visit:    1. Encounter for gynecological examination without abnormal finding (Primary)  Encouraged self breast exam, healthy eating and exercise  2. Screening for cervical cancer  -     LIQUID-BASED PAP SMEAR WITH HPV GENOTYPING REGARDLESS OF INTERPRETATION ([facility]); Future  -     LIQUID-BASED PAP SMEAR WITH HPV GENOTYPING REGARDLESS OF INTERPRETATION ([facility])    3. Encounter for initial prescription of implantable subdermal contraceptive  Will check benefits with insurance and place with next menses. Discussed side effects.             This note was electronically signed.    Abby Barber CNM   October 5, 2023

## 2023-10-09 LAB — REF LAB TEST METHOD: NORMAL

## 2023-12-05 ENCOUNTER — TELEPHONE (OUTPATIENT)
Dept: OBSTETRICS AND GYNECOLOGY | Facility: CLINIC | Age: 33
End: 2023-12-05
Payer: COMMERCIAL

## 2023-12-05 NOTE — TELEPHONE ENCOUNTER
"  Caller: Lindsay Francis \"Jeanine\"    Relationship: Self    Best call back number: [phone]    What is the best time to reach you: ANYTIME    Who are you requesting to speak with (clinical staff, provider,  specific staff member): MISSED CALL- THINKS IT WAS REGARDING HER NEXPLANON SHE WAS WAITING TO GET APPROVED BY HER INSURANCE    Do you know the name of the person who called: NO    What was the call regarding: NEXPLANON SHE THINKS    OK TO LVM   "

## 2023-12-29 ENCOUNTER — OFFICE VISIT (OUTPATIENT)
Dept: OBSTETRICS AND GYNECOLOGY | Facility: CLINIC | Age: 33
End: 2023-12-29
Payer: COMMERCIAL

## 2023-12-29 VITALS
HEIGHT: 65 IN | DIASTOLIC BLOOD PRESSURE: 78 MMHG | WEIGHT: 203 LBS | SYSTOLIC BLOOD PRESSURE: 126 MMHG | BODY MASS INDEX: 33.82 KG/M2

## 2023-12-29 DIAGNOSIS — Z30.017 NEXPLANON INSERTION: Primary | ICD-10-CM

## 2023-12-29 LAB
B-HCG UR QL: NEGATIVE
EXPIRATION DATE: NORMAL
INTERNAL NEGATIVE CONTROL: NEGATIVE
INTERNAL POSITIVE CONTROL: POSITIVE
Lab: NORMAL

## 2023-12-29 RX ORDER — ETONOGESTREL 68 MG/1
IMPLANT SUBCUTANEOUS
COMMUNITY
Start: 2023-12-01

## 2023-12-29 NOTE — PROGRESS NOTES
"Subjective   Chief Complaint   Patient presents with    In office procedure     Nexplanon insertion, patient supplied device, Negative UPT       Lindsay Francis is a 33 y.o. year old  presenting to be seen for Nexplanon insertion.  Had discussed Nexplanon at recent annual exam  Started menses yesterday     Past Medical History:   Diagnosis Date    Finger fracture     GERD (gastroesophageal reflux disease)     Kidney infection     Urinary tract infection         Current Outpatient Medications:     Nexplanon 68 MG implant subdermal implant, , Disp: , Rfl:   No current facility-administered medications for this visit.   Allergies   Allergen Reactions    Amoxil [Amoxicillin] Rash     yeast      Past Surgical History:   Procedure Laterality Date    DENTAL PROCEDURE      TONSILLECTOMY      WISDOM TOOTH EXTRACTION        Social History     Socioeconomic History    Marital status:    Tobacco Use    Smoking status: Never    Smokeless tobacco: Never   Vaping Use    Vaping Use: Never used   Substance and Sexual Activity    Alcohol use: Not Currently     Comment: rarely     Drug use: No    Sexual activity: Yes     Partners: Male     Birth control/protection: Nexplanon      Family History   Problem Relation Age of Onset    Arthritis Mother     Arthritis Father     Arthritis Maternal Grandmother        Review of Systems        Objective   /78   Ht 165.1 cm (65\")   Wt 92.1 kg (203 lb)   LMP 2023 (Exact Date)   BMI 33.78 kg/m²     Physical Exam       Result Review :                   Assessment and Plan  Diagnoses and all orders for this visit:    1. Nexplanon insertion (Primary)  -     Etonogestrel (NEXPLANON) 68 MG subdermal implant  -     POC Pregnancy, Urine      There are no Patient Instructions on file for this visit.           This note was electronically signed.    Анна Orourke PA-C   2023Nexplanon Insertion    Patient's last menstrual period was 2023 (exact " date).    Date of procedure:  12/29/2023    Risks and benefits discussed? yes  All questions answered? yes  Consents given by the patient  Written consent obtained? yes    Local anesthesia used:  yes - 1.5 cc's of  Meds; anesthesia local: None, 2% lidocaine with epinephrine    Procedure documentation:    The upper left arm was marked at the intended site of insertion.  Betadine was used to cleanse the skin.  Local anesthesia was injected.  The Nexplanon was placed subdermally without difficulty.  The devise was able to be palpated in the arm by both myself and Lindsay.  Steri-strips were then placed across the site of insertion and the arm was wrapped.    She tolerated the procedure well.  There were no complications.  EBL was minimal.    Post procedure instructions: Remove the wrapping in 24 hours and the steri-strips in 5 days.    Follow up needed: PRN    This note was electronically signed.    Анна Orourke PA-C  December 29, 2023

## 2024-02-28 ENCOUNTER — TELEPHONE (OUTPATIENT)
Dept: OBSTETRICS AND GYNECOLOGY | Facility: CLINIC | Age: 34
End: 2024-02-28
Payer: COMMERCIAL

## 2024-02-28 RX ORDER — NORETHINDRONE ACETATE AND ETHINYL ESTRADIOL 1MG-20(21)
1 KIT ORAL DAILY
Qty: 28 TABLET | Refills: 1 | Status: SHIPPED | OUTPATIENT
Start: 2024-02-28 | End: 2025-02-27

## 2024-02-28 NOTE — TELEPHONE ENCOUNTER
Given the COVID-19 pandemic, our practice has converted in-person visit to Telehealth visit.  Patient consented to virtual visit: Yes  Who is the patient receiving the virtual care (patient, guardian): Self  Where is the patient doing her virtual visit from: Home    Thalia is a 30 year old female who presents today for prenatal visit.  She is of 18w5d gestation.  OB History    Para Term  AB Living   3 2 2 0 0 2   SAB TAB Ectopic Molar Multiple Live Births   0 0 0 0 0 2      Feeling dizzy and faint \"not drinking that much\". Occurs when standing for long periods at a time. Eating three meals a day with snacks in between.    Positive fetal flutters, No bleeding, No rupture of membranes, No uterine contractions.    Did not get quad screen done, but would still like to get it completed.  Has not scheduled fetal echo.      ASSESSMENT:  Pregnancy at 18w5d weeks gestation.  Problem List Items Addressed This Visit        Other    Evaluate anatomy not seen on prior sonogram    Family history of congenital heart defect - Primary    Relevant Orders    FETAL ECHO COMPLETE    Short interval between pregnancies complicating pregnancy, antepartum          PLAN:  Has f/u growth , 4 week return scheduled for this day as well  Discussed window for quad screen  Follow up visit in 4 weeks  Provided order and phone number for fetal echo  Reminded pt to schedule breast u/s  Second trimester precautions    Time spent with patient was 15 minutes, more than 50% of time was spent face-to-face coordinating plan of care.    Left message for patient to call the office

## 2024-02-28 NOTE — TELEPHONE ENCOUNTER
Patient has been bleeding since Nexplanon insertion.  Patient is wondering if you would prescribe something to help with bleeding.

## 2024-03-01 ENCOUNTER — OFFICE VISIT (OUTPATIENT)
Dept: INTERNAL MEDICINE | Facility: CLINIC | Age: 34
End: 2024-03-01
Payer: COMMERCIAL

## 2024-03-01 VITALS
BODY MASS INDEX: 34.16 KG/M2 | OXYGEN SATURATION: 99 % | SYSTOLIC BLOOD PRESSURE: 141 MMHG | RESPIRATION RATE: 18 BRPM | WEIGHT: 205 LBS | TEMPERATURE: 98.3 F | HEART RATE: 92 BPM | DIASTOLIC BLOOD PRESSURE: 78 MMHG | HEIGHT: 65 IN

## 2024-03-01 DIAGNOSIS — E55.9 VITAMIN D INSUFFICIENCY: ICD-10-CM

## 2024-03-01 DIAGNOSIS — R06.83 SNORING: ICD-10-CM

## 2024-03-01 DIAGNOSIS — M79.89 SWELLING OF BOTH LOWER EXTREMITIES: ICD-10-CM

## 2024-03-01 DIAGNOSIS — R03.0 ELEVATED BLOOD PRESSURE READING: ICD-10-CM

## 2024-03-01 DIAGNOSIS — K59.04 CHRONIC IDIOPATHIC CONSTIPATION: ICD-10-CM

## 2024-03-01 DIAGNOSIS — Z13.21 SCREENING FOR ENDOCRINE, NUTRITIONAL, METABOLIC AND IMMUNITY DISORDER: ICD-10-CM

## 2024-03-01 DIAGNOSIS — Z13.0 SCREENING FOR ENDOCRINE, NUTRITIONAL, METABOLIC AND IMMUNITY DISORDER: ICD-10-CM

## 2024-03-01 DIAGNOSIS — Z00.00 PHYSICAL EXAM, ANNUAL: Primary | ICD-10-CM

## 2024-03-01 DIAGNOSIS — Z13.228 SCREENING FOR ENDOCRINE, NUTRITIONAL, METABOLIC AND IMMUNITY DISORDER: ICD-10-CM

## 2024-03-01 DIAGNOSIS — Z13.29 SCREENING FOR ENDOCRINE, NUTRITIONAL, METABOLIC AND IMMUNITY DISORDER: ICD-10-CM

## 2024-03-01 PROCEDURE — 99395 PREV VISIT EST AGE 18-39: CPT | Performed by: NURSE PRACTITIONER

## 2024-03-01 RX ORDER — HYDROCHLOROTHIAZIDE 12.5 MG/1
12.5 TABLET ORAL DAILY
Qty: 90 TABLET | Refills: 3 | Status: SHIPPED | OUTPATIENT
Start: 2024-03-01

## 2024-03-01 NOTE — PROGRESS NOTES
Chief Complaint   Patient presents with    Annual Exam       Lindsay Francis is a 33 y.o. female and is here for a comprehensive physical exam. The patient reports  that she has been struggling with her weight her whole life. She had an eating disorder in high school. She has tried Adipex in the past, but she did not like the way it made her feel, and it also affected her blood pressure. She feels she still has issues with blood pressure at times. She notes she has never been 200 pounds. She does not eat unhealthily, but she has challenged herself with eating protein because she does not eat meat. She has tried changing her diet but did not notice any difference. The patient denies any history of pancreatitis or gastroparesis. She has constipation.     She has Nexplanon placed in 12/2023. She has not had any weight gain since Nexplanon. She has been experiencing irregular bleeding for 6 weeks. She has mood swings. She had to use a tampon for the entire 6 weeks. Today is the first day she has not used a tampon. Her periods usually last for 3 to 4 days maximum. The patient was started on birth control pills this week, and she has been on it for 2 days.     She has been having trouble sleeping at night for years. She does snore. She pre-diagnosed herself with restless legs years ago.     She reports swelling that has been ongoing for years. Her rings are tight, and she wakes up with pain. She cannot wear leggings or socks because it applies pressure, causes severe pain, and swelling around her ankle area.     The patient notes her iron level is usually on the lower end. She denies any abnormal bruising or bleeding.     She has a red eye because her  poked her in the eye. It was bothersome this morning.     She fractured her foot a couple of years ago, and it never fully healed. She continues to experience swelling and pain. She also dropped her phone on her toe, but she can move it.     The patient has  never been exposed to hepatitis. She has never had a mammogram. She does self-breast exams.     Social history  She is working at [employer] as a .    Family history  She denies any family history of thyroid cancer. She has a family history of hypertension.     Current medications  She takes allergy medications.      History:  LMP: No LMP recorded (lmp unknown).  Last pap date: 20  Abnormal pap? no  : 1  Para: 1  STD:none  Age of menarche:15  Sexually active:17  Abuse:none      Do you take any herbs or supplements that were not prescribed by a doctor? no  Are you taking calcium supplements? no  Are you taking aspirin daily? no      Health Habits:  Dental Exam. not up to date - patient advised to schedule  Eye Exam. Not up date . patient advised to schedule  Dermatology.not up to date - patient advised to schedule  Exercise: 5 times/week.  Current exercise activities include: walking    Health Maintenance   Topic Date Due    HEPATITIS C SCREENING  Never done    ANNUAL PHYSICAL  2023    BMI FOLLOWUP  2023    INFLUENZA VACCINE  2024 (Originally 2023)    COVID-19 Vaccine (3 - -24 season) 2025 (Originally 2023)    PAP SMEAR  10/05/2026    TDAP/TD VACCINES (2 - Td or Tdap) 2031    Pneumococcal Vaccine 0-64  Aged Out       PMH, PSH, SocHx, FamHx, Allergies, and Medications: Reviewed and updated in the Visit Navigator.     Allergies   Allergen Reactions    Amoxil [Amoxicillin] Rash     yeast     Past Medical History:   Diagnosis Date    Finger fracture     GERD (gastroesophageal reflux disease)     Kidney infection     Urinary tract infection      Past Surgical History:   Procedure Laterality Date    DENTAL PROCEDURE      TONSILLECTOMY      WISDOM TOOTH EXTRACTION       Social History     Socioeconomic History    Marital status:    Tobacco Use    Smoking status: Never    Smokeless tobacco: Never  "  Vaping Use    Vaping Use: Never used   Substance and Sexual Activity    Alcohol use: Not Currently     Comment: rarely     Drug use: No    Sexual activity: Yes     Partners: Male     Birth control/protection: Nexplanon     Family History   Problem Relation Age of Onset    Arthritis Mother     Arthritis Father     Arthritis Maternal Grandmother        Review of Systems  Review of Systems      Vitals:    03/01/24 1542   BP: 141/78   Pulse: 92   Resp: 18   Temp: 98.3 °F (36.8 °C)   SpO2: 99%       Objective   /78   Pulse 92   Temp 98.3 °F (36.8 °C) (Temporal)   Resp 18   Ht 165.1 cm (65\")   Wt 93 kg (205 lb)   LMP  (LMP Unknown) Comment: patient has been bleeding x 6 weeks after getting the naplanon  SpO2 99%   BMI 34.11 kg/m²   BMI is >= 30 and <35. (Class 1 Obesity). The following options were offered after discussion;: exercise counseling/recommendations and nutrition counseling/recommendations      Physical Exam  Musculoskeletal:      Comments: Normal pulses.               Assessment & Plan   1. Healthy female exam.    2. Patient Counseling: Including but not Limited to the following, when appropriate:  --Nutrition: Stressed importance of moderation in sodium/caffeine intake, saturated fat and cholesterol, caloric balance, sufficient intake of fresh fruits, vegetables, fiber, calcium, iron, and 1 mg of folate supplement per day (for females capable of pregnancy).  --Discussed the issue of estrogen replacement, calcium supplement, and the daily use of baby aspirin.  --Exercise: Stressed the importance of regular exercise.   --Substance Abuse: Discussed cessation/primary prevention of tobacco, alcohol, or other drug use; driving or other dangerous activities under the influence; availability of treatment for abuse, as indicated based on social history.    --Sexuality: Discussed sexually transmitted diseases, partner selection, use of condoms, avoidance of unintended pregnancy  and contraceptive " alternatives.   --Injury prevention: Discussed safety belts, safety helmets, smoke detector, smoking near bedding or upholstery.   --Dental health: Discussed importance of regular tooth brushing, flossing, and dental visits.  --Immunizations reviewed.  --Discussed benefits of colon cancer screening.      3. Discussed the patient's BMI with her.  The BMI is above average; BMI management plan is completed  4. No follow-ups on file.  5. Age-appropriate Screening Scheduled      Assessment & Plan     Diagnoses and all orders for this visit:    1. Physical exam, annual (Primary)  -     Comprehensive Metabolic Panel  -     Lipid Panel  -     CBC Auto Differential    2. Chronic idiopathic constipation    3. Screening for endocrine, nutritional, metabolic and immunity disorder  -     Hemoglobin A1c  -     Vitamin B12  -     TSH  -     T4, Free  -     Insulin, Free & Total, Serum  -     Iron and TIBC  -     Ferritin    4. Vitamin D insufficiency  -     Vitamin D,25-Hydroxy    5. Elevated blood pressure reading  -     hydroCHLOROthiazide 12.5 MG tablet; Take 1 tablet by mouth Daily.  Dispense: 90 tablet; Refill: 3    6. Swelling of both lower extremities  -     hydroCHLOROthiazide 12.5 MG tablet; Take 1 tablet by mouth Daily.  Dispense: 90 tablet; Refill: 3    7. Snoring  -     Ambulatory Referral to Sleep Medicine      Weight   Discussed weight loss medications, risks, benefits, and side effects. Her protein level has always been on the lower end but improving over the years. Suggested bariatric diet for adequate protein intake. Advised to stay hydrated. Labs ordered. Recommended vitamin D to help with weight loss and sleep. Depending on her lab results, I will start her on vitamin D 50,000 international units for 12 weeks and then she will take a daily supplement of 2000 international units. In the wintertime, I recommend 4000 international units.       Transcribed from ambient dictation for JULIO C Danielson by  Chris Ortiz.  03/04/24   09:16 EST    Patient or patient representative verbalized consent to the visit recording.  I have personally performed the services described in this document as transcribed by the above individual, and it is both accurate and complete.

## 2024-03-02 ENCOUNTER — LAB (OUTPATIENT)
Dept: LAB | Facility: HOSPITAL | Age: 34
End: 2024-03-02
Payer: COMMERCIAL

## 2024-03-02 LAB
25(OH)D3 SERPL-MCNC: 12.4 NG/ML (ref 30–100)
ALBUMIN SERPL-MCNC: 4.2 G/DL (ref 3.5–5.2)
ALBUMIN/GLOB SERPL: 1.9 G/DL
ALP SERPL-CCNC: 60 U/L (ref 39–117)
ALT SERPL W P-5'-P-CCNC: 12 U/L (ref 1–33)
ANION GAP SERPL CALCULATED.3IONS-SCNC: 12.7 MMOL/L (ref 5–15)
AST SERPL-CCNC: 14 U/L (ref 1–32)
BASOPHILS # BLD AUTO: 0.02 10*3/MM3 (ref 0–0.2)
BASOPHILS NFR BLD AUTO: 0.4 % (ref 0–1.5)
BILIRUB SERPL-MCNC: 0.5 MG/DL (ref 0–1.2)
BUN SERPL-MCNC: 12 MG/DL (ref 6–20)
BUN/CREAT SERPL: 16.2 (ref 7–25)
CALCIUM SPEC-SCNC: 8.6 MG/DL (ref 8.6–10.5)
CHLORIDE SERPL-SCNC: 106 MMOL/L (ref 98–107)
CHOLEST SERPL-MCNC: 117 MG/DL (ref 0–200)
CO2 SERPL-SCNC: 22.3 MMOL/L (ref 22–29)
CREAT SERPL-MCNC: 0.74 MG/DL (ref 0.57–1)
DEPRECATED RDW RBC AUTO: 38.4 FL (ref 37–54)
EGFRCR SERPLBLD CKD-EPI 2021: 111.1 ML/MIN/1.73
EOSINOPHIL # BLD AUTO: 0.04 10*3/MM3 (ref 0–0.4)
EOSINOPHIL NFR BLD AUTO: 0.7 % (ref 0.3–6.2)
ERYTHROCYTE [DISTWIDTH] IN BLOOD BY AUTOMATED COUNT: 11.9 % (ref 12.3–15.4)
FERRITIN SERPL-MCNC: 53.3 NG/ML (ref 13–150)
GLOBULIN UR ELPH-MCNC: 2.2 GM/DL
GLUCOSE SERPL-MCNC: 95 MG/DL (ref 65–99)
HBA1C MFR BLD: 5.4 % (ref 4.8–5.6)
HCT VFR BLD AUTO: 37.8 % (ref 34–46.6)
HDLC SERPL-MCNC: 35 MG/DL (ref 40–60)
HGB BLD-MCNC: 12.3 G/DL (ref 12–15.9)
IMM GRANULOCYTES # BLD AUTO: 0.01 10*3/MM3 (ref 0–0.05)
IMM GRANULOCYTES NFR BLD AUTO: 0.2 % (ref 0–0.5)
IRON 24H UR-MRATE: 72 MCG/DL (ref 37–145)
IRON SATN MFR SERPL: 22 % (ref 20–50)
LDLC SERPL CALC-MCNC: 70 MG/DL (ref 0–100)
LDLC/HDLC SERPL: 2.04 {RATIO}
LYMPHOCYTES # BLD AUTO: 1 10*3/MM3 (ref 0.7–3.1)
LYMPHOCYTES NFR BLD AUTO: 18.6 % (ref 19.6–45.3)
MCH RBC QN AUTO: 28.8 PG (ref 26.6–33)
MCHC RBC AUTO-ENTMCNC: 32.5 G/DL (ref 31.5–35.7)
MCV RBC AUTO: 88.5 FL (ref 79–97)
MONOCYTES # BLD AUTO: 0.33 10*3/MM3 (ref 0.1–0.9)
MONOCYTES NFR BLD AUTO: 6.1 % (ref 5–12)
NEUTROPHILS NFR BLD AUTO: 3.99 10*3/MM3 (ref 1.7–7)
NEUTROPHILS NFR BLD AUTO: 74 % (ref 42.7–76)
NRBC BLD AUTO-RTO: 0 /100 WBC (ref 0–0.2)
PLATELET # BLD AUTO: 377 10*3/MM3 (ref 140–450)
PMV BLD AUTO: 9.7 FL (ref 6–12)
POTASSIUM SERPL-SCNC: 3.8 MMOL/L (ref 3.5–5.2)
PROT SERPL-MCNC: 6.4 G/DL (ref 6–8.5)
RBC # BLD AUTO: 4.27 10*6/MM3 (ref 3.77–5.28)
SODIUM SERPL-SCNC: 141 MMOL/L (ref 136–145)
T4 FREE SERPL-MCNC: 1.23 NG/DL (ref 0.93–1.7)
TIBC SERPL-MCNC: 332 MCG/DL (ref 298–536)
TRANSFERRIN SERPL-MCNC: 223 MG/DL (ref 200–360)
TRIGL SERPL-MCNC: 53 MG/DL (ref 0–150)
TSH SERPL DL<=0.05 MIU/L-ACNC: 1.33 UIU/ML (ref 0.27–4.2)
VIT B12 BLD-MCNC: 305 PG/ML (ref 211–946)
VLDLC SERPL-MCNC: 12 MG/DL (ref 5–40)
WBC NRBC COR # BLD AUTO: 5.39 10*3/MM3 (ref 3.4–10.8)

## 2024-03-02 PROCEDURE — 82607 VITAMIN B-12: CPT | Performed by: NURSE PRACTITIONER

## 2024-03-02 PROCEDURE — 83525 ASSAY OF INSULIN: CPT | Performed by: NURSE PRACTITIONER

## 2024-03-02 PROCEDURE — 83540 ASSAY OF IRON: CPT | Performed by: NURSE PRACTITIONER

## 2024-03-02 PROCEDURE — 82306 VITAMIN D 25 HYDROXY: CPT | Performed by: NURSE PRACTITIONER

## 2024-03-02 PROCEDURE — 80061 LIPID PANEL: CPT | Performed by: NURSE PRACTITIONER

## 2024-03-02 PROCEDURE — 36415 COLL VENOUS BLD VENIPUNCTURE: CPT | Performed by: NURSE PRACTITIONER

## 2024-03-02 PROCEDURE — 84466 ASSAY OF TRANSFERRIN: CPT | Performed by: NURSE PRACTITIONER

## 2024-03-02 PROCEDURE — 82728 ASSAY OF FERRITIN: CPT | Performed by: NURSE PRACTITIONER

## 2024-03-02 PROCEDURE — 83527 ASSAY OF INSULIN: CPT | Performed by: NURSE PRACTITIONER

## 2024-03-02 PROCEDURE — 83036 HEMOGLOBIN GLYCOSYLATED A1C: CPT | Performed by: NURSE PRACTITIONER

## 2024-03-02 PROCEDURE — 84439 ASSAY OF FREE THYROXINE: CPT | Performed by: NURSE PRACTITIONER

## 2024-03-02 PROCEDURE — 80050 GENERAL HEALTH PANEL: CPT | Performed by: NURSE PRACTITIONER

## 2024-03-04 RX ORDER — ERGOCALCIFEROL 1.25 MG/1
50000 CAPSULE ORAL WEEKLY
Qty: 12 CAPSULE | Refills: 0 | Status: SHIPPED | OUTPATIENT
Start: 2024-03-04

## 2024-03-04 NOTE — PROGRESS NOTES
Please contact patient and let her know that labs are unremarkable with exception of her vitamin D which is deficient and I can send in 50,000 for 12 weeks and then patient needs to take a daily supplement of 2000 once Rx is completed and in the wintertime recommend taking 4000 as her vitamin D has been low for some time.  She needs to improve her HDL which is the good cholesterol with diet and exercise.  Her lymphocytes were slightly low but white blood cell count in normal range.  Her vitamin B12 is on the lower end of normal recommend increasing B12 in diet and she may want to take a daily supplement of 500 mcg over-the-counter.

## 2024-03-08 LAB
INSULIN FREE SERPL-ACNC: 8.7 UU/ML
INSULIN SERPL-ACNC: 8.7 UU/ML

## 2024-06-07 ENCOUNTER — TELEPHONE (OUTPATIENT)
Dept: OBSTETRICS AND GYNECOLOGY | Facility: CLINIC | Age: 34
End: 2024-06-07
Payer: COMMERCIAL

## 2024-06-07 NOTE — TELEPHONE ENCOUNTER
"    Caller: FrancisLindsay Jeanine \"Jeanine\"    Relationship: Self    Best call back number: [phone] CALL ANYTIME, IT IS OKAY TO LVM.    Requested Prescriptions:   Requested Prescriptions      No prescriptions requested or ordered in this encounter      LOESTRIN/norethindrone-ethinyl estradiol FE (Junel FE 1/20) 1-20 MG-MCG per tablet     Pharmacy where request should be sent: [id number] [place], [place] - [facility] [place] [facility] [place] [address] AT [facility] [address] - [phone] PH - [phone] FX     Last office visit with prescribing clinician: 12/29/2023   Last telemedicine visit with prescribing clinician: Visit date not found   Next office visit with prescribing clinician: Visit date not found     Additional details provided by patient: PATIENT IS REQUESTING A CALL BACK TO DISCUSS IF LOESTRIN IS NEEDED AGAIN FOR ABNORMAL BLEEDING WITH NEXPLANON. FINISHED PREVIOUS 2 MONTH PRESCRIPTION. STARTED BLEEDING AGAIN TWO WEEKS AGO. BLEEDING HAS BEEN LIGHT TO A MENSTRUAL LIKE FLOW. NEXPLANON INSERTED 12/29/23.    Does the patient have less than a 3 day supply:  [x] Yes  [] No    Would you like a call back once the refill request has been completed: [x] Yes [] No    If the office needs to give you a call back, can they leave a voicemail: [x] Yes [] No    Shiva Wright   06/07/24 11:03 EDT           "

## 2024-06-21 RX ORDER — NORETHINDRONE ACETATE AND ETHINYL ESTRADIOL 1MG-20(21)
1 KIT ORAL DAILY
Qty: 28 TABLET | Refills: 1 | Status: SHIPPED | OUTPATIENT
Start: 2024-06-21 | End: 2025-06-21

## 2024-06-28 ENCOUNTER — OFFICE VISIT (OUTPATIENT)
Dept: INTERNAL MEDICINE | Facility: CLINIC | Age: 34
End: 2024-06-28
Payer: COMMERCIAL

## 2024-06-28 VITALS
RESPIRATION RATE: 17 BRPM | DIASTOLIC BLOOD PRESSURE: 81 MMHG | SYSTOLIC BLOOD PRESSURE: 129 MMHG | HEIGHT: 65 IN | TEMPERATURE: 99 F | HEART RATE: 97 BPM | WEIGHT: 204 LBS | BODY MASS INDEX: 33.99 KG/M2 | OXYGEN SATURATION: 100 %

## 2024-06-28 DIAGNOSIS — E66.09 CLASS 1 OBESITY DUE TO EXCESS CALORIES WITH SERIOUS COMORBIDITY AND BODY MASS INDEX (BMI) OF 33.0 TO 33.9 IN ADULT: Primary | ICD-10-CM

## 2024-06-28 PROCEDURE — 99213 OFFICE O/P EST LOW 20 MIN: CPT | Performed by: NURSE PRACTITIONER

## 2024-06-28 RX ORDER — PHENTERMINE HYDROCHLORIDE 37.5 MG/1
37.5 TABLET ORAL
Qty: 30 TABLET | Refills: 0 | Status: SHIPPED | OUTPATIENT
Start: 2024-06-28

## 2024-07-06 NOTE — PROGRESS NOTES
Chief Complaint / Reason:      Chief Complaint   Patient presents with    Obesity     Discuss adipex        Subjective     History of Present Illness  The patient is a 33-year-old female who is here to discuss weight loss medication and would like to go back on Adipex that she has been on it in the past. Her BMI is 33.95. Vital signs are stable.    The patient expresses a desire to resume Adipex, having previously been prescribed at a weight loss clinic. She discontinued the medication on the same day due to withdrawal symptoms. She has abstained from consuming soda since 12/2023, and her daily caffeine intake is limited to one cup of coffee, occasionally up to three cups. She reports a perceived increase in her weight, a change from her previous weight of 300 pounds to 100 pounds.    History taken from: patient    PMH/FH/Social History were reviewed and updated appropriately in the electronic medical record.   Past Medical History:   Diagnosis Date    Finger fracture     GERD (gastroesophageal reflux disease)     Kidney infection     Urinary tract infection      Past Surgical History:   Procedure Laterality Date    DENTAL PROCEDURE      TONSILLECTOMY      WISDOM TOOTH EXTRACTION       Social History     Socioeconomic History    Marital status:    Tobacco Use    Smoking status: Never    Smokeless tobacco: Never   Vaping Use    Vaping status: Never Used   Substance and Sexual Activity    Alcohol use: Not Currently     Comment: rarely     Drug use: No    Sexual activity: Yes     Partners: Male     Birth control/protection: Nexplanon     Family History   Problem Relation Age of Onset    Arthritis Mother     Arthritis Father     Arthritis Maternal Grandmother        Review of Systems:   Review of Systems      All other systems were reviewed and are negative.  Exceptions are noted in the subjective or above.      Objective     Vital Signs  Vitals:    06/28/24 1322   BP: 129/81   Pulse: 97   Resp: 17   Temp: 99  °F (37.2 °C)   SpO2: 100%       Body mass index is 33.95 kg/m².  BMI is >= 30 and <35. (Class 1 Obesity). The following options were offered after discussion;: exercise counseling/recommendations and nutrition counseling/recommendations       Physical Exam  Vitals and nursing note reviewed.   Constitutional:       General: She is not in acute distress.     Appearance: She is well-developed. She is obese.   Cardiovascular:      Rate and Rhythm: Normal rate and regular rhythm.      Pulses: Normal pulses.      Heart sounds: Normal heart sounds.   Pulmonary:      Effort: Pulmonary effort is normal.      Breath sounds: Normal breath sounds. No wheezing.   Chest:      Chest wall: No tenderness.   Skin:     General: Skin is warm and dry.      Capillary Refill: Capillary refill takes less than 2 seconds.      Coloration: Skin is not pale.      Findings: No erythema or rash.   Neurological:      Mental Status: She is alert and oriented to person, place, and time.   Psychiatric:         Behavior: Behavior normal.         Thought Content: Thought content normal.         Judgment: Judgment normal.              Results Review:    I reviewed the patient's new clinical results.       Medication Review:     Current Outpatient Medications:     hydroCHLOROthiazide 12.5 MG tablet, Take 1 tablet by mouth Daily., Disp: 90 tablet, Rfl: 3    Nexplanon 68 MG implant subdermal implant, , Disp: , Rfl:     norethindrone-ethinyl estradiol FE (Junel FE 1/20) 1-20 MG-MCG per tablet, Take 1 tablet by mouth Daily., Disp: 28 tablet, Rfl: 1    phentermine (Adipex-P) 37.5 MG tablet, Take 1 tablet by mouth Every Morning Before Breakfast., Disp: 30 tablet, Rfl: 0    Diagnoses and all orders for this visit:    Class 1 obesity due to excess calories with serious comorbidity and body mass index (BMI) of 33.0 to 33.9 in adult  -     phentermine (Adipex-P) 37.5 MG tablet; Take 1 tablet by mouth Every Morning Before Breakfast.      Assessment & Plan  1.  Weight management.  A prescription for Adipex has been issued. The patient has been advised to exercise thrice weekly, aiming for 30 minutes to an hour, and to incorporate protein-rich foods into her diet.    Follow-up  A follow-up appointment is scheduled for 4 weeks from now.    Return in about 4 weeks (around 7/26/2024), or if symptoms worsen or fail to improve.    JULIO C Danielson  06/28/2024    Patient or patient representative verbalized consent for the use of Ambient Listening during the visit with  JULIO C Danielson for chart documentation. 7/6/2024  14:39 EDT

## 2024-07-29 ENCOUNTER — OFFICE VISIT (OUTPATIENT)
Dept: INTERNAL MEDICINE | Facility: CLINIC | Age: 34
End: 2024-07-29
Payer: COMMERCIAL

## 2024-07-29 VITALS
OXYGEN SATURATION: 100 % | HEIGHT: 65 IN | BODY MASS INDEX: 31.99 KG/M2 | TEMPERATURE: 97.6 F | HEART RATE: 103 BPM | DIASTOLIC BLOOD PRESSURE: 77 MMHG | WEIGHT: 192 LBS | SYSTOLIC BLOOD PRESSURE: 133 MMHG | RESPIRATION RATE: 16 BRPM

## 2024-07-29 DIAGNOSIS — E53.8 B12 DEFICIENCY: Primary | ICD-10-CM

## 2024-07-29 DIAGNOSIS — E66.09 CLASS 1 OBESITY DUE TO EXCESS CALORIES WITH SERIOUS COMORBIDITY AND BODY MASS INDEX (BMI) OF 33.0 TO 33.9 IN ADULT: ICD-10-CM

## 2024-07-29 DIAGNOSIS — J30.9 ALLERGIC RHINITIS, UNSPECIFIED SEASONALITY, UNSPECIFIED TRIGGER: ICD-10-CM

## 2024-07-29 PROCEDURE — 99214 OFFICE O/P EST MOD 30 MIN: CPT | Performed by: NURSE PRACTITIONER

## 2024-07-29 RX ORDER — FLUTICASONE PROPIONATE 50 MCG
2 SPRAY, SUSPENSION (ML) NASAL DAILY
Qty: 18.2 ML | Refills: 1 | Status: SHIPPED | OUTPATIENT
Start: 2024-07-29

## 2024-07-29 RX ORDER — PHENTERMINE HYDROCHLORIDE 37.5 MG/1
37.5 TABLET ORAL
Qty: 30 TABLET | Refills: 0 | Status: SHIPPED | OUTPATIENT
Start: 2024-07-29

## 2024-07-29 RX ORDER — CYANOCOBALAMIN 1000 UG/ML
INJECTION, SOLUTION INTRAMUSCULAR; SUBCUTANEOUS
Qty: 10 ML | Refills: 0 | Status: SHIPPED | OUTPATIENT
Start: 2024-07-29

## 2024-07-29 NOTE — PROGRESS NOTES
Chief Complaint / Reason:      Chief Complaint   Patient presents with    Obesity     F/u on adipex        Subjective     History of Present Illness  The patient is a 34-year-old female who is here for follow-up on Adipex. She is due for urine drug screen and requesting refill. Blood pressure is slightly elevated and she has continued to lose weight. She is down to 192.    The patient reports an improvement in her condition with the use of Adipex, although she experienced a recent illness. Previously, she was unable to tolerate the medication due to consumption of carbonated beverages, caffeine, and coffee. Despite attempts to abstain from coffee for a few weeks, she continued to experience a sensation akin to barbing, leading her to discontinue its use. Her dietary habits have been suboptimal, and she expresses a desire for a tooth extraction tomorrow. Despite her efforts to increase her protein intake, she finds it challenging to maintain energy levels. In 03/2023, during her annual follow-up, she was prescribed vitamin D and advised to take over-the-counter B12. Despite completing the prescribed vitamin D, she discontinued B12 due to associated nausea. She suspects her energy levels may be related to her menstrual cycle.    The patient reports experiencing a sensation of fluid in her ear, accompanied by a popping sensation when lying down.    Supplemental Information  She is going to dermatology on Wednesday for a mole on her rib. It does not look good. It was itching really bad, super dried out and bleeding. She denies any tick bite.    History taken from: patient    PMH/FH/Social History were reviewed and updated appropriately in the electronic medical record.   Past Medical History:   Diagnosis Date    Finger fracture     GERD (gastroesophageal reflux disease)     Kidney infection     Urinary tract infection      Past Surgical History:   Procedure Laterality Date    DENTAL PROCEDURE      TONSILLECTOMY       WISDOM TOOTH EXTRACTION       Social History     Socioeconomic History    Marital status:    Tobacco Use    Smoking status: Never    Smokeless tobacco: Never   Vaping Use    Vaping status: Never Used   Substance and Sexual Activity    Alcohol use: Not Currently     Comment: rarely     Drug use: No    Sexual activity: Yes     Partners: Male     Birth control/protection: Nexplanon     Family History   Problem Relation Age of Onset    Arthritis Mother     Arthritis Father     Arthritis Maternal Grandmother        Review of Systems:   Review of Systems      All other systems were reviewed and are negative.  Exceptions are noted in the subjective or above.      Objective     Vital Signs  Vitals:    07/29/24 1718   BP: 133/77   Pulse: 103   Resp: 16   Temp: 97.6 °F (36.4 °C)   SpO2: 100%       Body mass index is 31.95 kg/m².  BMI is >= 30 and <35. (Class 1 Obesity). The following options were offered after discussion;: exercise counseling/recommendations and nutrition counseling/recommendations       Physical Exam  Vitals and nursing note reviewed.   Constitutional:       General: She is not in acute distress.     Appearance: She is well-developed. She is obese.   HENT:      Head: Normocephalic and atraumatic.      Right Ear: Ear canal and external ear normal. Tympanic membrane is erythematous and bulging.      Left Ear: Ear canal and external ear normal. Tympanic membrane is erythematous and bulging.      Nose: Mucosal edema present. No rhinorrhea.      Right Sinus: No maxillary sinus tenderness or frontal sinus tenderness.      Left Sinus: No maxillary sinus tenderness or frontal sinus tenderness.      Mouth/Throat:      Mouth: Mucous membranes are dry.      Pharynx: Posterior oropharyngeal erythema present.      Comments: PND    Eyes:      Conjunctiva/sclera: Conjunctivae normal.   Cardiovascular:      Rate and Rhythm: Regular rhythm. Tachycardia present.      Heart sounds: Normal heart sounds.   Pulmonary:       Effort: Pulmonary effort is normal. No respiratory distress.      Breath sounds: Normal breath sounds.   Lymphadenopathy:      Head:      Right side of head: No submental, submandibular or tonsillar adenopathy.      Left side of head: No submental, submandibular or tonsillar adenopathy.      Cervical: No cervical adenopathy.   Skin:     General: Skin is warm and dry.      Capillary Refill: Capillary refill takes less than 2 seconds.      Findings: No rash.   Neurological:      Mental Status: She is alert and oriented to person, place, and time.   Psychiatric:         Behavior: Behavior normal.         Thought Content: Thought content normal.         Judgment: Judgment normal.              Results Review:    I reviewed the patient's new clinical results.       Medication Review:     Current Outpatient Medications:     hydroCHLOROthiazide 12.5 MG tablet, Take 1 tablet by mouth Daily., Disp: 90 tablet, Rfl: 3    Nexplanon 68 MG implant subdermal implant, , Disp: , Rfl:     norethindrone-ethinyl estradiol FE (Junel FE 1/20) 1-20 MG-MCG per tablet, Take 1 tablet by mouth Daily., Disp: 28 tablet, Rfl: 1    phentermine (Adipex-P) 37.5 MG tablet, Take 1 tablet by mouth Every Morning Before Breakfast., Disp: 30 tablet, Rfl: 0    Diagnoses and all orders for this visit:    B12 deficiency  -     cyanocobalamin 1000 MCG/ML injection; Inject 1 ml subcutaneously every 28 days x 6 doses    Class 1 obesity due to excess calories with serious comorbidity and body mass index (BMI) of 33.0 to 33.9 in adult  -     Compliance Drug Analysis, Ur - Urine, Clean Catch  -     phentermine (Adipex-P) 37.5 MG tablet; Take 1 tablet by mouth Every Morning Before Breakfast.    Allergic rhinitis, unspecified seasonality, unspecified trigger  -     fluticasone (FLONASE) 50 MCG/ACT nasal spray; 2 sprays into the nostril(s) as directed by provider Daily.      Assessment & Plan  1. Weight management.  The patient was advised to incorporate protein-rich  foods into her diet, such as Greek yogurt, turkey, tacos, and beans into her diet. A prescription for B12 injections, to be administered monthly, was issued.    2. Ear pain.  A prescription for Flonase was provided.    Follow-up  A follow-up appointment is scheduled for 4 weeks from now.    Return if symptoms worsen or fail to improve.    JULIO C Danielson  07/29/2024    Patient or patient representative verbalized consent for the use of Ambient Listening during the visit with  JULIO C Danielson for chart documentation. 8/15/2024  17:26 EDT

## 2024-08-06 LAB — DRUGS UR: NORMAL

## 2024-08-29 ENCOUNTER — OFFICE VISIT (OUTPATIENT)
Dept: INTERNAL MEDICINE | Facility: CLINIC | Age: 34
End: 2024-08-29
Payer: COMMERCIAL

## 2024-08-29 VITALS
DIASTOLIC BLOOD PRESSURE: 90 MMHG | SYSTOLIC BLOOD PRESSURE: 142 MMHG | RESPIRATION RATE: 16 BRPM | OXYGEN SATURATION: 99 % | WEIGHT: 187 LBS | HEIGHT: 65 IN | HEART RATE: 118 BPM | BODY MASS INDEX: 31.16 KG/M2 | TEMPERATURE: 98.3 F

## 2024-08-29 DIAGNOSIS — K59.03 DRUG-INDUCED CONSTIPATION: ICD-10-CM

## 2024-08-29 DIAGNOSIS — R39.9 URINARY TRACT INFECTION SYMPTOMS: ICD-10-CM

## 2024-08-29 DIAGNOSIS — E66.09 CLASS 1 OBESITY DUE TO EXCESS CALORIES WITH SERIOUS COMORBIDITY AND BODY MASS INDEX (BMI) OF 33.0 TO 33.9 IN ADULT: ICD-10-CM

## 2024-08-29 DIAGNOSIS — R03.0 ELEVATED BLOOD PRESSURE READING: Primary | ICD-10-CM

## 2024-08-29 PROCEDURE — 99213 OFFICE O/P EST LOW 20 MIN: CPT | Performed by: NURSE PRACTITIONER

## 2024-08-29 RX ORDER — DOCUSATE SODIUM 100 MG/1
100 CAPSULE, LIQUID FILLED ORAL 2 TIMES DAILY
Qty: 60 CAPSULE | Refills: 1 | Status: SHIPPED | OUTPATIENT
Start: 2024-08-29

## 2024-08-29 RX ORDER — PHENTERMINE HYDROCHLORIDE 37.5 MG/1
37.5 TABLET ORAL
Qty: 30 TABLET | Refills: 0 | Status: SHIPPED | OUTPATIENT
Start: 2024-08-29

## 2024-08-29 NOTE — PROGRESS NOTES
Chief Complaint / Reason:      Chief Complaint   Patient presents with    Obesity     F/u on adipex        Subjective     History of Present Illness  The patient is a 34-year-old female here for a follow-up on Adipex. She has lost weight but reports elevated blood pressure at 142/90 and a heart rate of 118. She attributes these elevated vitals to a long, stressful day at work and does not monitor them at home. She takes Adipex daily in the morning without experiencing any side effects and requests a refill of this medication. She also mentions having coffee today, which she hasn't consumed in the past 3 weeks.    She suspects a urinary tract infection (UTI) and has been self-medicating with cefdinir twice daily since Monday, which has alleviated her symptoms. She also took Pyridium on Monday.    She experiences constipation, a pre-existing condition, and has been taking Bugbee glyce once daily since last weekend. Her diet includes avocados, eggs, and meat for dinner. She had an adverse reaction to cottage cheese last night. She consumes shakes and [employer] protein smoothies but avoids aspartame and sodium.    History taken from: patient    PMH/FH/Social History were reviewed and updated appropriately in the electronic medical record.   Past Medical History:   Diagnosis Date    Finger fracture     GERD (gastroesophageal reflux disease)     Kidney infection     Urinary tract infection      Past Surgical History:   Procedure Laterality Date    DENTAL PROCEDURE      TONSILLECTOMY      WISDOM TOOTH EXTRACTION       Social History     Socioeconomic History    Marital status:    Tobacco Use    Smoking status: Never    Smokeless tobacco: Never   Vaping Use    Vaping status: Never Used   Substance and Sexual Activity    Alcohol use: Not Currently     Comment: rarely     Drug use: No    Sexual activity: Yes     Partners: Male     Birth control/protection: Nexplanon     Family History   Problem Relation Age of Onset     Arthritis Mother     Arthritis Father     Arthritis Maternal Grandmother        Review of Systems:   Review of Systems      All other systems were reviewed and are negative.  Exceptions are noted in the subjective or above.      Objective     Vital Signs  Vitals:    08/29/24 1712   BP: 142/90   Pulse: 118   Resp: 16   Temp: 98.3 °F (36.8 °C)   SpO2: 99%       Body mass index is 31.12 kg/m².  BMI is >= 30 and <35. (Class 1 Obesity). The following options were offered after discussion;: exercise counseling/recommendations and nutrition counseling/recommendations       Physical Exam  Vitals and nursing note reviewed.   Constitutional:       General: She is not in acute distress.     Appearance: She is well-developed. She is obese.   Cardiovascular:      Rate and Rhythm: Normal rate and regular rhythm.      Pulses: Normal pulses.      Heart sounds: Normal heart sounds.   Pulmonary:      Effort: Pulmonary effort is normal.      Breath sounds: Normal breath sounds. No wheezing.   Chest:      Chest wall: No tenderness.   Skin:     General: Skin is warm and dry.      Capillary Refill: Capillary refill takes less than 2 seconds.      Coloration: Skin is not pale.      Findings: No erythema or rash.   Neurological:      Mental Status: She is alert and oriented to person, place, and time.   Psychiatric:         Behavior: Behavior normal.         Thought Content: Thought content normal.         Judgment: Judgment normal.              Results Review:    I reviewed the patient's new clinical results.       Medication Review:     Current Outpatient Medications:     cyanocobalamin 1000 MCG/ML injection, Inject 1 ml subcutaneously every 28 days x 6 doses, Disp: 10 mL, Rfl: 0    fluticasone (FLONASE) 50 MCG/ACT nasal spray, 2 sprays into the nostril(s) as directed by provider Daily., Disp: 18.2 mL, Rfl: 1    hydroCHLOROthiazide 12.5 MG tablet, Take 1 tablet by mouth Daily., Disp: 90 tablet, Rfl: 3    Nexplanon 68 MG implant subdermal  implant, , Disp: , Rfl:     phentermine (Adipex-P) 37.5 MG tablet, Take 1 tablet by mouth Every Morning Before Breakfast., Disp: 30 tablet, Rfl: 0    norethindrone-ethinyl estradiol FE (Junel FE 1/20) 1-20 MG-MCG per tablet, Take 1 tablet by mouth Daily. (Patient not taking: Reported on 8/29/2024), Disp: 28 tablet, Rfl: 1    Diagnoses and all orders for this visit:    Elevated blood pressure reading    Class 1 obesity due to excess calories with serious comorbidity and body mass index (BMI) of 33.0 to 33.9 in adult  -     phentermine (Adipex-P) 37.5 MG tablet; Take 1 tablet by mouth Every Morning Before Breakfast.    Urinary tract infection symptoms    Drug-induced constipation  -     docusate sodium (Colace) 100 MG capsule; Take 1 capsule by mouth 2 (Two) Times a Day.      Assessment & Plan  1. Hypertension.  Her blood pressure is elevated at 142/90, and her heart rate is 118. She has been advised to monitor her blood pressure and heart rate at home, especially before starting her workday. She was also reminded to take her blood pressure medication regularly. A refill of Adipex was provided and [facility] to [facility].    2. Urinary Tract Infection.  She reported symptoms of a UTI, including painful urination and blood in the urine, which began on Monday. She has been self-treating with cefdinir twice a day since Monday, which has helped alleviate her symptoms. She was advised to continue taking cefdinir. If symptoms persist, she should swing by next week to leave a urine sample, ensuring not to take Pyridium before the test as it can skew results. Increased water intake, possibly with lemon, was recommended to help prevent future UTIs.    3. Constipation.  She reported experiencing constipation, which she has had issues with previously. She was advised to counteract constipation to prevent UTIs. She was recommended to take Colace twice a day as a stool softener, with the option to reduce to once daily if  bowel movements normalize. Dulcolax was suggested for use if she has not had a bowel movement for a few days. She was also advised to stay hydrated and consider incorporating more fiber into her diet.    Follow-up  The patient will follow up in 4 weeks.    No follow-ups on file.    JULIO C Danielson  08/29/2024    Patient or patient representative verbalized consent for the use of Ambient Listening during the visit with  JULIO C Danielson for chart documentation.

## 2024-09-04 ENCOUNTER — TELEPHONE (OUTPATIENT)
Dept: INTERNAL MEDICINE | Facility: CLINIC | Age: 34
End: 2024-09-04

## 2024-09-04 ENCOUNTER — CLINICAL SUPPORT (OUTPATIENT)
Dept: INTERNAL MEDICINE | Facility: CLINIC | Age: 34
End: 2024-09-04
Payer: COMMERCIAL

## 2024-09-04 DIAGNOSIS — R39.9 URINARY SYMPTOM OR SIGN: Primary | ICD-10-CM

## 2024-09-04 LAB
BILIRUB BLD-MCNC: NEGATIVE MG/DL
CLARITY, POC: CLEAR
COLOR UR: YELLOW
EXPIRATION DATE: ABNORMAL
GLUCOSE UR STRIP-MCNC: NEGATIVE MG/DL
KETONES UR QL: NEGATIVE
LEUKOCYTE EST, POC: ABNORMAL
Lab: ABNORMAL
NITRITE UR-MCNC: NEGATIVE MG/ML
PH UR: 6.5 [PH] (ref 5–8)
PROT UR STRIP-MCNC: NEGATIVE MG/DL
RBC # UR STRIP: ABNORMAL /UL
SP GR UR: 1.01 (ref 1–1.03)
UROBILINOGEN UR QL: NORMAL

## 2024-09-04 PROCEDURE — 81003 URINALYSIS AUTO W/O SCOPE: CPT | Performed by: NURSE PRACTITIONER

## 2024-09-04 RX ORDER — PHENAZOPYRIDINE HYDROCHLORIDE 100 MG/1
100 TABLET, FILM COATED ORAL 3 TIMES DAILY PRN
Qty: 6 TABLET | Refills: 0 | Status: SHIPPED | OUTPATIENT
Start: 2024-09-04

## 2024-09-04 RX ORDER — NITROFURANTOIN 25; 75 MG/1; MG/1
100 CAPSULE ORAL EVERY 12 HOURS SCHEDULED
Qty: 14 CAPSULE | Refills: 0 | Status: SHIPPED | OUTPATIENT
Start: 2024-09-04 | End: 2024-09-11

## 2024-09-04 NOTE — TELEPHONE ENCOUNTER
"  Caller: Lindsay Francis \"Jeanine\"    Relationship: Self    Best call back number: [phone]     What is the best time to reach you: ANY    Who are you requesting to speak with (clinical staff, provider,  specific staff member): NURSE    Do you know the name of the person who called: PATIENT    What was the call regarding: PATIENT STILL HAS UTI.  WAS TOLD SHE COULD DROP OFF A URINE SPECIMEN.  NEEDS ORDERS PLEASE.  WAS ALSO TOLD SHE WOULD GET ANOTHER ANTIBIOTIC FOR THIS.      PHARMACY:  [facility]    Is it okay if the provider responds through MyChart: PHONE CALL PLEASE  "

## 2024-09-04 NOTE — TELEPHONE ENCOUNTER
"  Caller: Lindsay Francis \"Jeanine\"    Relationship: Self    Best call back number: [phone]     What is the best time to reach you: ANYTIME, OK TO LEAVE VOICEMAIL.    Who are you requesting to speak with (clinical staff, provider,  specific staff member): CLINICAL STAFF    Do you know the name of the person who called: PATIENT    What was the call regarding: PATIENT IS CALLING TO GET AN UPDATE ON THIS. PATIENT LEAVES WORK AT 4:30 PM AND IF MEDICATION WILL BE SENT AFTER THAT TIME, PLEASE SEND TO [facility] IN [place] ON [facility]. ALSO, PATIENT WAS TOLD LAST WEEK THAT IF A URINE SAMPLE NEEDED TO BE REPEATED THAT SHE COULD JUST DO A WALK IN. PLEASE CALL BACK TO DISCUSS.    Is it okay if the provider responds through [facility]: NO CALL ONLY        "

## 2024-09-07 LAB
BACTERIA UR CULT: ABNORMAL
BACTERIA UR CULT: ABNORMAL

## 2024-09-10 LAB
BACTERIA UR CULT: ABNORMAL
BACTERIA UR CULT: ABNORMAL
OTHER ANTIBIOTIC SUSC ISLT: ABNORMAL

## 2024-09-26 ENCOUNTER — OFFICE VISIT (OUTPATIENT)
Dept: INTERNAL MEDICINE | Facility: CLINIC | Age: 34
End: 2024-09-26
Payer: COMMERCIAL

## 2024-09-26 VITALS
HEART RATE: 111 BPM | HEIGHT: 65 IN | SYSTOLIC BLOOD PRESSURE: 125 MMHG | BODY MASS INDEX: 30.16 KG/M2 | DIASTOLIC BLOOD PRESSURE: 60 MMHG | OXYGEN SATURATION: 100 % | RESPIRATION RATE: 17 BRPM | WEIGHT: 181 LBS | TEMPERATURE: 97.1 F

## 2024-09-26 DIAGNOSIS — E66.09 CLASS 1 OBESITY DUE TO EXCESS CALORIES WITH SERIOUS COMORBIDITY AND BODY MASS INDEX (BMI) OF 30.0 TO 30.9 IN ADULT: Primary | ICD-10-CM

## 2024-09-26 DIAGNOSIS — E53.8 B12 DEFICIENCY: ICD-10-CM

## 2024-09-26 PROCEDURE — 99213 OFFICE O/P EST LOW 20 MIN: CPT | Performed by: NURSE PRACTITIONER

## 2024-09-26 RX ORDER — CYANOCOBALAMIN 1000 UG/ML
INJECTION, SOLUTION INTRAMUSCULAR; SUBCUTANEOUS
Qty: 10 ML | Refills: 0 | Status: SHIPPED | OUTPATIENT
Start: 2024-09-26

## 2024-09-26 RX ORDER — PHENTERMINE HYDROCHLORIDE 37.5 MG/1
37.5 TABLET ORAL
Qty: 30 TABLET | Refills: 0 | Status: SHIPPED | OUTPATIENT
Start: 2024-09-26

## 2024-10-02 DIAGNOSIS — J30.9 ALLERGIC RHINITIS, UNSPECIFIED SEASONALITY, UNSPECIFIED TRIGGER: ICD-10-CM

## 2024-10-02 RX ORDER — FLUTICASONE PROPIONATE 50 UG/1
SPRAY, METERED NASAL
Qty: 16 G | Refills: 1 | Status: SHIPPED | OUTPATIENT
Start: 2024-10-02

## 2024-10-28 ENCOUNTER — OFFICE VISIT (OUTPATIENT)
Dept: INTERNAL MEDICINE | Facility: CLINIC | Age: 34
End: 2024-10-28
Payer: COMMERCIAL

## 2024-10-28 VITALS
WEIGHT: 180 LBS | OXYGEN SATURATION: 97 % | DIASTOLIC BLOOD PRESSURE: 74 MMHG | RESPIRATION RATE: 16 BRPM | BODY MASS INDEX: 29.99 KG/M2 | HEART RATE: 104 BPM | HEIGHT: 65 IN | TEMPERATURE: 98 F | SYSTOLIC BLOOD PRESSURE: 126 MMHG

## 2024-10-28 DIAGNOSIS — E66.3 OVERWEIGHT WITH BODY MASS INDEX (BMI) OF 29 TO 29.9 IN ADULT: ICD-10-CM

## 2024-10-28 PROCEDURE — 99213 OFFICE O/P EST LOW 20 MIN: CPT | Performed by: NURSE PRACTITIONER

## 2024-10-28 RX ORDER — PHENTERMINE HYDROCHLORIDE 37.5 MG/1
37.5 TABLET ORAL
Qty: 30 TABLET | Refills: 0 | Status: SHIPPED | OUTPATIENT
Start: 2024-10-28

## 2024-10-28 NOTE — PROGRESS NOTES
Chief Complaint / Reason:      Chief Complaint   Patient presents with    Obesity     F/u on adipex        Subjective     History of Present Illness  The patient is a 34-year-old female here for a follow-up on obesity. She has been taking Adipex.    She reports a persistently high heart rate, which she attributes to work-related stress.    Despite her efforts, she has only managed to lose 1 pound. She monitors her weight regularly and notes slight fluctuations. She admits to struggling with protein intake, despite consuming meat. Occasionally, she uses protein shakes as a supplement but has not tried Premier protein or Fairlife due to their cost. She expresses a dislike for peanut butter but enjoys strawberries and bananas. Her  has been assisting with meal preparation recently. She is unsure about the appropriate number of eggs to consume daily. While she has a preference for eggs, she has been unable to incorporate cottage cheese into her diet.    She did leave a urine drug screen.    History taken from: patient    PMH/FH/Social History were reviewed and updated appropriately in the electronic medical record.   Past Medical History:   Diagnosis Date    Finger fracture     GERD (gastroesophageal reflux disease)     Kidney infection     Urinary tract infection      Past Surgical History:   Procedure Laterality Date    DENTAL PROCEDURE      TONSILLECTOMY      WISDOM TOOTH EXTRACTION       Social History     Socioeconomic History    Marital status:    Tobacco Use    Smoking status: Never    Smokeless tobacco: Never   Vaping Use    Vaping status: Never Used   Substance and Sexual Activity    Alcohol use: Not Currently     Comment: rarely     Drug use: No    Sexual activity: Yes     Partners: Male     Birth control/protection: Nexplanon     Family History   Problem Relation Age of Onset    Arthritis Mother     Arthritis Father     Arthritis Maternal Grandmother        Review of Systems:   Review of  Systems      All other systems were reviewed and are negative.  Exceptions are noted in the subjective or above.      Objective     Vital Signs  Vitals:    10/28/24 1711   BP: 126/74   Pulse: 104   Resp: 16   Temp: 98 °F (36.7 °C)   SpO2: 97%       Body mass index is 29.95 kg/m².  BMI is >= 25 and <30. (Overweight) The following options were offered after discussion;: exercise counseling/recommendations and nutrition counseling/recommendations       Physical Exam  Vitals and nursing note reviewed.   Constitutional:       General: She is not in acute distress.     Appearance: She is well-developed. She is obese.   Cardiovascular:      Rate and Rhythm: Normal rate and regular rhythm.      Pulses: Normal pulses.      Heart sounds: Normal heart sounds.   Pulmonary:      Effort: Pulmonary effort is normal.      Breath sounds: Normal breath sounds. No wheezing.   Chest:      Chest wall: No tenderness.   Skin:     General: Skin is warm and dry.      Capillary Refill: Capillary refill takes less than 2 seconds.      Coloration: Skin is not pale.      Findings: No erythema or rash.   Neurological:      Mental Status: She is alert and oriented to person, place, and time.   Psychiatric:         Behavior: Behavior normal.         Thought Content: Thought content normal.         Judgment: Judgment normal.              Results Review:    I reviewed the patient's new clinical results.       Medication Review:     Current Outpatient Medications:     cyanocobalamin 1000 MCG/ML injection, Inject 1 ml subcutaneously every 28 days x 6 doses, Disp: 10 mL, Rfl: 0    docusate sodium (Colace) 100 MG capsule, Take 1 capsule by mouth 2 (Two) Times a Day., Disp: 60 capsule, Rfl: 1    fluticasone (FLONASE) 50 MCG/ACT nasal spray, USE 2 SPRAYS TO EACH NOSTRIL EVERY DAY, Disp: 16 g, Rfl: 1    hydroCHLOROthiazide 12.5 MG tablet, Take 1 tablet by mouth Daily., Disp: 90 tablet, Rfl: 3    Nexplanon 68 MG implant subdermal implant, , Disp: , Rfl:      phenazopyridine (Pyridium) 100 MG tablet, Take 1 tablet by mouth 3 (Three) Times a Day As Needed for Bladder Spasms or Dysuria., Disp: 6 tablet, Rfl: 0    phentermine (Adipex-P) 37.5 MG tablet, Take 1 tablet by mouth Every Morning Before Breakfast., Disp: 30 tablet, Rfl: 0    norethindrone-ethinyl estradiol FE (Junel FE 1/20) 1-20 MG-MCG per tablet, Take 1 tablet by mouth Daily. (Patient not taking: Reported on 10/28/2024), Disp: 28 tablet, Rfl: 1    Diagnoses and all orders for this visit:    Overweight with body mass index (BMI) of 29 to 29.9 in adult  -     Compliance Drug Analysis, Ur - Urine, Clean Catch  -     phentermine (Adipex-P) 37.5 MG tablet; Take 1 tablet by mouth Every Morning Before Breakfast.      Assessment & Plan  1. Obesity.  Her heart rate remains elevated, likely due to work-related stress, despite normal blood pressure. Weight has fluctuated between 192 and 180 pounds, with a recent decrease to 187 pounds. Protein intake is suboptimal, which may be contributing to the lack of weight loss. BMI is currently 29. She was advised to monitor her heart rate closely. A protein calculator was recommended to determine the appropriate amount of protein for her weight loss goals. She was encouraged to increase protein intake to at least 75 to 80 grams per day, potentially through protein shakes such as Premier Protein or Fairlife, which can be mixed with milk or other ingredients to create a nutritious smoothie. Incorporating cottage cheese into her diet was also suggested. She was advised to explore different meal plans and keep a food journal to track progress.    Follow-up  Return in 3 months for follow up.    Return in about 3 months (around 1/28/2025), or if symptoms worsen or fail to improve.    JULIO C Dnaielson  10/28/2024    Patient or patient representative verbalized consent for the use of Ambient Listening during the visit with  JULIO C Danielson for chart documentation.

## 2024-11-07 LAB — DRUGS UR: NORMAL

## 2025-01-02 ENCOUNTER — HOSPITAL ENCOUNTER (EMERGENCY)
Facility: HOSPITAL | Age: 35
Discharge: HOME | End: 2025-01-02
Attending: STUDENT IN AN ORGANIZED HEALTH CARE EDUCATION/TRAINING PROGRAM
Payer: COMMERCIAL

## 2025-01-02 VITALS
TEMPERATURE: 97.9 F | DIASTOLIC BLOOD PRESSURE: 82 MMHG | RESPIRATION RATE: 18 BRPM | WEIGHT: 180.8 LBS | BODY MASS INDEX: 30.12 KG/M2 | OXYGEN SATURATION: 100 % | SYSTOLIC BLOOD PRESSURE: 135 MMHG | HEIGHT: 65 IN | HEART RATE: 110 BPM

## 2025-01-02 DIAGNOSIS — N39.0 URINARY TRACT INFECTION WITHOUT HEMATURIA, SITE UNSPECIFIED: Primary | ICD-10-CM

## 2025-01-02 LAB
ALBUMIN SERPL-MCNC: 4.1 G/DL (ref 3.5–5.2)
ALBUMIN/GLOB SERPL: 1.5 G/DL
ALP SERPL-CCNC: 71 U/L (ref 39–117)
ALT SERPL W P-5'-P-CCNC: 12 U/L (ref 1–33)
AMORPH URATE CRY URNS QL MICRO: ABNORMAL /HPF
ANION GAP SERPL CALCULATED.3IONS-SCNC: 11 MMOL/L (ref 5–15)
AST SERPL-CCNC: 16 U/L (ref 1–32)
B-HCG UR QL: NEGATIVE
BACTERIA UR QL AUTO: ABNORMAL /HPF
BASOPHILS # BLD AUTO: 0.02 10*3/MM3 (ref 0–0.2)
BASOPHILS NFR BLD AUTO: 0.2 % (ref 0–1.5)
BILIRUB SERPL-MCNC: 0.2 MG/DL (ref 0–1.2)
BILIRUB UR QL STRIP: NEGATIVE
BLADDER EPITHELIAL: ABNORMAL /LPF
BUN SERPL-MCNC: 11 MG/DL (ref 6–20)
BUN/CREAT SERPL: 13.6 (ref 7–25)
CALCIUM SPEC-SCNC: 9.3 MG/DL (ref 8.6–10.5)
CHLORIDE SERPL-SCNC: 100 MMOL/L (ref 98–107)
CLARITY UR: ABNORMAL
CO2 SERPL-SCNC: 26 MMOL/L (ref 22–29)
COLOR UR: YELLOW
CREAT SERPL-MCNC: 0.81 MG/DL (ref 0.57–1)
DEPRECATED RDW RBC AUTO: 39.3 FL (ref 37–54)
EGFRCR SERPLBLD CKD-EPI 2021: 99.1 ML/MIN/1.73
EOSINOPHIL # BLD AUTO: 0.08 10*3/MM3 (ref 0–0.4)
EOSINOPHIL NFR BLD AUTO: 0.8 % (ref 0.3–6.2)
ERYTHROCYTE [DISTWIDTH] IN BLOOD BY AUTOMATED COUNT: 12.2 % (ref 12.3–15.4)
GLOBULIN UR ELPH-MCNC: 2.8 GM/DL
GLUCOSE SERPL-MCNC: 104 MG/DL (ref 65–99)
GLUCOSE UR STRIP-MCNC: NEGATIVE MG/DL
HCT VFR BLD AUTO: 37.7 % (ref 34–46.6)
HGB BLD-MCNC: 12.4 G/DL (ref 12–15.9)
HGB UR QL STRIP.AUTO: NEGATIVE
HOLD SPECIMEN: NORMAL
HOLD SPECIMEN: NORMAL
HYALINE CASTS UR QL AUTO: ABNORMAL /LPF
IMM GRANULOCYTES # BLD AUTO: 0.03 10*3/MM3 (ref 0–0.05)
IMM GRANULOCYTES NFR BLD AUTO: 0.3 % (ref 0–0.5)
KETONES UR QL STRIP: NEGATIVE
LEUKOCYTE ESTERASE UR QL STRIP.AUTO: ABNORMAL
LIPASE SERPL-CCNC: 39 U/L (ref 13–60)
LYMPHOCYTES # BLD AUTO: 1.58 10*3/MM3 (ref 0.7–3.1)
LYMPHOCYTES NFR BLD AUTO: 16.2 % (ref 19.6–45.3)
MCH RBC QN AUTO: 29.1 PG (ref 26.6–33)
MCHC RBC AUTO-ENTMCNC: 32.9 G/DL (ref 31.5–35.7)
MCV RBC AUTO: 88.5 FL (ref 79–97)
MONOCYTES # BLD AUTO: 0.49 10*3/MM3 (ref 0.1–0.9)
MONOCYTES NFR BLD AUTO: 5 % (ref 5–12)
NEUTROPHILS NFR BLD AUTO: 7.56 10*3/MM3 (ref 1.7–7)
NEUTROPHILS NFR BLD AUTO: 77.5 % (ref 42.7–76)
NITRITE UR QL STRIP: NEGATIVE
NRBC BLD AUTO-RTO: 0 /100 WBC (ref 0–0.2)
PH UR STRIP.AUTO: 8 [PH] (ref 5–8)
PLATELET # BLD AUTO: 400 10*3/MM3 (ref 140–450)
PMV BLD AUTO: 9 FL (ref 6–12)
POTASSIUM SERPL-SCNC: 3.8 MMOL/L (ref 3.5–5.2)
PROT SERPL-MCNC: 6.9 G/DL (ref 6–8.5)
PROT UR QL STRIP: NEGATIVE
RBC # BLD AUTO: 4.26 10*6/MM3 (ref 3.77–5.28)
RBC # UR STRIP: ABNORMAL /HPF
REF LAB TEST METHOD: ABNORMAL
SODIUM SERPL-SCNC: 137 MMOL/L (ref 136–145)
SP GR UR STRIP: 1.02 (ref 1–1.03)
SQUAMOUS #/AREA URNS HPF: ABNORMAL /HPF
UROBILINOGEN UR QL STRIP: ABNORMAL
WBC # UR STRIP: ABNORMAL /HPF
WBC NRBC COR # BLD AUTO: 9.76 10*3/MM3 (ref 3.4–10.8)
WHOLE BLOOD HOLD COAG: NORMAL
WHOLE BLOOD HOLD SPECIMEN: NORMAL

## 2025-01-02 PROCEDURE — 83690 ASSAY OF LIPASE: CPT

## 2025-01-02 PROCEDURE — 80053 COMPREHEN METABOLIC PANEL: CPT

## 2025-01-02 PROCEDURE — 81025 URINE PREGNANCY TEST: CPT | Performed by: STUDENT IN AN ORGANIZED HEALTH CARE EDUCATION/TRAINING PROGRAM

## 2025-01-02 PROCEDURE — 85025 COMPLETE CBC W/AUTO DIFF WBC: CPT

## 2025-01-02 PROCEDURE — 81001 URINALYSIS AUTO W/SCOPE: CPT | Performed by: STUDENT IN AN ORGANIZED HEALTH CARE EDUCATION/TRAINING PROGRAM

## 2025-01-02 PROCEDURE — 99283 EMERGENCY DEPT VISIT LOW MDM: CPT | Performed by: STUDENT IN AN ORGANIZED HEALTH CARE EDUCATION/TRAINING PROGRAM

## 2025-01-02 RX ORDER — CEFUROXIME AXETIL 250 MG/1
500 TABLET ORAL ONCE
Status: COMPLETED | OUTPATIENT
Start: 2025-01-02 | End: 2025-01-02

## 2025-01-02 RX ORDER — SODIUM CHLORIDE 0.9 % (FLUSH) 0.9 %
10 SYRINGE (ML) INJECTION AS NEEDED
Status: DISCONTINUED | OUTPATIENT
Start: 2025-01-02 | End: 2025-01-03 | Stop reason: HOSPADM

## 2025-01-02 RX ORDER — CEFUROXIME AXETIL 500 MG/1
500 TABLET ORAL 2 TIMES DAILY
Qty: 10 TABLET | Refills: 0 | Status: SHIPPED | OUTPATIENT
Start: 2025-01-02 | End: 2025-01-04

## 2025-01-02 RX ADMIN — CEFUROXIME AXETIL 500 MG: 250 TABLET ORAL at 22:50

## 2025-01-03 DIAGNOSIS — E66.3 OVERWEIGHT WITH BODY MASS INDEX (BMI) OF 29 TO 29.9 IN ADULT: ICD-10-CM

## 2025-01-03 NOTE — TELEPHONE ENCOUNTER
Rx Refill Note  Requested Prescriptions     Pending Prescriptions Disp Refills    phentermine (ADIPEX-P) 37.5 MG tablet [Pharmacy Med Name: PHENTERMINE 37.5MG TABLETS] 30 tablet      Sig: TAKE 1 TABLET BY MOUTH EVERY MORNING BEFORE BREAKFAST      Last office visit with prescribing clinician: 10/28/2024   Last telemedicine visit with prescribing clinician: Visit date not found   Next office visit with prescribing clinician: 1/30/2025                         Would you like a call back once the refill request has been completed: [] Yes [] No    If the office needs to give you a call back, can they leave a voicemail: [] Yes [] No    Yoav Rogers MA  01/03/25, 15:51 EST

## 2025-01-03 NOTE — ED PROVIDER NOTES
[facility] EMERGENCY DEPARTMENT  Emergency Department Encounter  Emergency Medicine Physician Note       Pt Name: Lindsay Francis  MRN: [id number]  Pt :   1990  Room Number:  24SF/24  Date of encounter:  2025  PCP: Theresa Esteves APRN  ED Provider: Thony Marrero MD    Historian: Patient      HPI:  Chief Complaint: Abdominal pain        Context: Lindsay Francis is a 34 y.o. female who presents to the ED c/o abdominal pain.  Patient states that around 7 PM she had sudden onset of right sided abdominal pain.  Pain was so severe it made her cry but then has since eased up and is not having any pain since arriving to ER.  Denies any vomiting.  Denies any dysuria but does state that she has had urinary frequency over the past few days.  Reports history of UTI and kidney infection on that side before.  Denies any history of kidney stone.  Currently has Nexplanon and has had normal periods every month except for December so is unsure if there is a possibility of pregnancy, although she had taken a negative pregnancy test recently at home.  No other symptoms reported at this time.      PAST MEDICAL HISTORY  Past Medical History:   Diagnosis Date    Finger fracture     GERD (gastroesophageal reflux disease)     Kidney infection     Urinary tract infection          PAST SURGICAL HISTORY  Past Surgical History:   Procedure Laterality Date    DENTAL PROCEDURE      TONSILLECTOMY      WISDOM TOOTH EXTRACTION           FAMILY HISTORY  Family History   Problem Relation Age of Onset    Arthritis Mother     Arthritis Father     Arthritis Maternal Grandmother          SOCIAL HISTORY  Social History     Socioeconomic History    Marital status:    Tobacco Use    Smoking status: Never    Smokeless tobacco: Never   Vaping Use    Vaping status: Never Used   Substance and Sexual Activity    Alcohol use: Not Currently     Comment: rarely     Drug use: No    Sexual activity: Yes      Partners: Male     Birth control/protection: Nexplanon         ALLERGIES  Amoxil [amoxicillin]        REVIEW OF SYSTEMS  Review of Systems     All systems reviewed and negative except for those discussed in HPI.       PHYSICAL EXAM    I have reviewed the triage vital signs and nursing notes.    ED Triage Vitals [01/02/25 2001]   Temp Heart Rate Resp BP SpO2   97.9 °F (36.6 °C) 110 18 135/82 100 %      Temp src Heart Rate Source Patient Position BP Location FiO2 (%)   Oral Monitor Sitting Right arm --       Physical Exam    General:  Awake, alert, no acute distress  HEENT: Atraumatic, normocephalic, EOMI, PERRLA, mucous membranes moist  NECK:  Supple, atraumatic  Cardiovascular:  Regular rate, regular rhythm, no murmurs, rubs, or gallops.  Extremities well perfused   Respiratory:  Regular rate, clear lungs to auscultation bilaterally.  No rhonchi, rales, wheezing  Abdominal:  Soft, nondistended, nontender.  No guarding or rebound.  No palpable masses  Extremity:  No visible bony abnormalities in all 4 extremities.  Full range of motion of all extremities.  Skin:  Warm and dry.  No rashes  Neuro:  AAOx3, GCS 15. Cranial nerves 2-12 grossly intact.  No focal strength or sensation deficits.  Psych:  Mood and affect appropriate.        LAB RESULTS  Recent Results (from the past 24 hours)   Comprehensive Metabolic Panel    Collection Time: 01/02/25  8:48 PM    Specimen: Blood   Result Value Ref Range    Glucose 104 (H) 65 - 99 mg/dL    BUN 11 6 - 20 mg/dL    Creatinine 0.81 0.57 - 1.00 mg/dL    Sodium 137 136 - 145 mmol/L    Potassium 3.8 3.5 - 5.2 mmol/L    Chloride 100 98 - 107 mmol/L    CO2 26.0 22.0 - 29.0 mmol/L    Calcium 9.3 8.6 - 10.5 mg/dL    Total Protein 6.9 6.0 - 8.5 g/dL    Albumin 4.1 3.5 - 5.2 g/dL    ALT (SGPT) 12 1 - 33 U/L    AST (SGOT) 16 1 - 32 U/L    Alkaline Phosphatase 71 39 - 117 U/L    Total Bilirubin 0.2 0.0 - 1.2 mg/dL    Globulin 2.8 gm/dL    A/G Ratio 1.5 g/dL    BUN/Creatinine Ratio 13.6 7.0  - 25.0    Anion Gap 11.0 5.0 - 15.0 mmol/L    eGFR 99.1 >60.0 mL/min/1.73   Lipase    Collection Time: 01/02/25  8:48 PM    Specimen: Blood   Result Value Ref Range    Lipase 39 13 - 60 U/L   Green Top (Gel)    Collection Time: 01/02/25  8:48 PM   Result Value Ref Range    Extra Tube Hold for add-ons.    Lavender Top    Collection Time: 01/02/25  8:48 PM   Result Value Ref Range    Extra Tube hold for add-on    Gold Top - SST    Collection Time: 01/02/25  8:48 PM   Result Value Ref Range    Extra Tube Hold for add-ons.    Light Blue Top    Collection Time: 01/02/25  8:48 PM   Result Value Ref Range    Extra Tube Hold for add-ons.    CBC Auto Differential    Collection Time: 01/02/25  8:48 PM    Specimen: Blood   Result Value Ref Range    WBC 9.76 3.40 - 10.80 10*3/mm3    RBC 4.26 3.77 - 5.28 10*6/mm3    Hemoglobin 12.4 12.0 - 15.9 g/dL    Hematocrit 37.7 34.0 - 46.6 %    MCV 88.5 79.0 - 97.0 fL    MCH 29.1 26.6 - 33.0 pg    MCHC 32.9 31.5 - 35.7 g/dL    RDW 12.2 (L) 12.3 - 15.4 %    RDW-SD 39.3 37.0 - 54.0 fl    MPV 9.0 6.0 - 12.0 fL    Platelets 400 140 - 450 10*3/mm3    Neutrophil % 77.5 (H) 42.7 - 76.0 %    Lymphocyte % 16.2 (L) 19.6 - 45.3 %    Monocyte % 5.0 5.0 - 12.0 %    Eosinophil % 0.8 0.3 - 6.2 %    Basophil % 0.2 0.0 - 1.5 %    Immature Grans % 0.3 0.0 - 0.5 %    Neutrophils, Absolute 7.56 (H) 1.70 - 7.00 10*3/mm3    Lymphocytes, Absolute 1.58 0.70 - 3.10 10*3/mm3    Monocytes, Absolute 0.49 0.10 - 0.90 10*3/mm3    Eosinophils, Absolute 0.08 0.00 - 0.40 10*3/mm3    Basophils, Absolute 0.02 0.00 - 0.20 10*3/mm3    Immature Grans, Absolute 0.03 0.00 - 0.05 10*3/mm3    nRBC 0.0 0.0 - 0.2 /100 WBC   Urinalysis With Microscopic If Indicated (No Culture) - Urine, Clean Catch    Collection Time: 01/02/25  9:44 PM    Specimen: Urine, Clean Catch   Result Value Ref Range    Color, UA Yellow Yellow, Straw    Appearance, UA Turbid (A) Clear    pH, UA 8.0 5.0 - 8.0    Specific Gravity, UA 1.019 1.005 - 1.030     Glucose, UA Negative Negative    Ketones, UA Negative Negative    Bilirubin, UA Negative Negative    Blood, UA Negative Negative    Protein, UA Negative Negative    Leuk Esterase, UA Moderate (2+) (A) Negative    Nitrite, UA Negative Negative    Urobilinogen, UA 1.0 E.U./dL 0.2 - 1.0 E.U./dL   Pregnancy, Urine - Urine, Clean Catch    Collection Time: 01/02/25  9:44 PM    Specimen: Urine, Clean Catch   Result Value Ref Range    HCG, Urine QL Negative Negative   Urinalysis, Microscopic Only - Urine, Clean Catch    Collection Time: 01/02/25  9:44 PM    Specimen: Urine, Clean Catch   Result Value Ref Range    RBC, UA None Seen None Seen, 0-2 /HPF    WBC, UA 11-20 (A) None Seen, 0-2 /HPF    Bacteria, UA 2+ (A) None Seen /HPF    Squamous Epithelial Cells, UA 0-2 None Seen, 0-2 /HPF    Bladder Epithelial Cells, UA 0-2 0 - 2 /LPF    Hyaline Casts, UA None Seen None Seen /LPF    Amorphous Crystals, UA Moderate/2+ None Seen /HPF    Methodology Manual Light Microscopy        If labs were ordered, I independently evaluated the results and considered them in treating the patient.        RADIOLOGY  No Radiology Exams Resulted Within Past 24 Hours    I ordered and independently evaluated the above noted radiographic studies.     See radiologist's dictation for official interpretation.        PROCEDURES    Procedures    No orders to display       MEDICATIONS GIVEN IN ER    Medications   sodium chloride 0.9 % flush 10 mL (has no administration in time range)   cefuroxime (CEFTIN) tablet 500 mg (500 mg Oral Given 1/2/25 2250)         MEDICAL DECISION MAKING, PROGRESS, and CONSULTS    All labs, if obtained, have been independently reviewed by me.  All radiology studies, if obtained, have been evaluated by me and the radiologist dictating the report.  All EKG's, if obtained, have been independently viewed and interpreted by me.      Discussion below represents my analysis of pertinent findings related to patient's condition,  differential diagnosis, treatment plan and final disposition.    Lindsay Francis is a 34 y.o. female who presents to the ED c/o abdominal pain.  Hemodynamically stable nontoxic appearance on arrival.  Minimally symptomatic on arrival stating that pain has nearly fully subsided rating it at 0.  Differential includes was not limited to UTI, pyelonephritis, kidney stone, hemorrhagic cyst, and less likely appendicitis or ovarian torsion.  Patient has no tenderness to palpation on abdominal exam currently.  Declines any pain medications.  CBC shows no significant leukocytosis or anemia.  CMP shows no critical electrolyte abnormalities or LFT elevations.  Normal lipase.    Urinalysis shows 2+ bacteria along with 11-20 WBCs and moderate leukocytes.  Will empirically treat with Ceftin for UTI.  Remained stable in emergency department with no further recurrence of abdominal pain on repeat examinations.  Advised on return precautions the importance of following up with primary care provider.  Patient agreeable with this plan and was discharged.                                 Orders placed during this visit:  Orders Placed This Encounter   Procedures    Cross Draw    Comprehensive Metabolic Panel    Lipase    Urinalysis With Microscopic If Indicated (No Culture) - Urine, Clean Catch    CBC Auto Differential    Pregnancy, Urine - Urine, Clean Catch    Urinalysis, Microscopic Only - Urine, Clean Catch    NPO Diet NPO Type: Strict NPO    Undress & Gown    Insert Peripheral IV    CBC & Differential    Green Top (Gel)    Lavender Top    Gold Top - SST    Light Blue Top         ED Course:    Consultants: None                Shared Decision Making:  After my consideration of clinical presentation and any laboratory/radiology studies obtained, I discussed the findings with the patient/patient representative who is in agreement with the treatment plan and the final disposition.   Risks and benefits of discharge and/or  observation/admission were discussed.      AS OF 23:01 EST VITALS:    BP - 135/82  HR - 110  TEMP - 97.9 °F (36.6 °C) (Oral)  O2 SATS - 100%                  DIAGNOSIS  Final diagnoses:   Urinary tract infection without hematuria, site unspecified         DISPOSITION  Discharge      Please note that portions of this document were completed with voice recognition software.        Thony Marrero MD  01/02/25 1352

## 2025-01-03 NOTE — DISCHARGE INSTRUCTIONS
Take Ceftin as prescribed twice daily for the next 5 days.  Drink plenty of fluids.  Continue Tylenol and Motrin as needed for intermittent pain, however recommend returning to emergency department if pain continues to worsen or you start developing fevers despite antibiotics.

## 2025-01-04 ENCOUNTER — TELEPHONE (OUTPATIENT)
Dept: INTERNAL MEDICINE | Facility: CLINIC | Age: 35
End: 2025-01-04
Payer: COMMERCIAL

## 2025-01-04 RX ORDER — NITROFURANTOIN 25; 75 MG/1; MG/1
100 CAPSULE ORAL 2 TIMES DAILY
Qty: 6 CAPSULE | Refills: 0 | Status: SHIPPED | OUTPATIENT
Start: 2025-01-04

## 2025-01-04 NOTE — TELEPHONE ENCOUNTER
Pt was seen in the ER on 1/2/2025 with a UTI. She was rxed Cefuroxime and she reported about an hour or so later, she became extremely nauseated. She got the medication filled/picked up 1/3/2025 and had the same reaction after taking the required dose. She wants to know if the abx can be switched to Macrobid since she keeps getting nauseous with the other abx. If it can be switched, she uses [facility] [place]. Please advise.

## 2025-01-06 RX ORDER — PHENTERMINE HYDROCHLORIDE 37.5 MG/1
37.5 TABLET ORAL
Qty: 30 TABLET | Refills: 0 | Status: SHIPPED | OUTPATIENT
Start: 2025-01-06

## 2025-01-30 ENCOUNTER — OFFICE VISIT (OUTPATIENT)
Dept: INTERNAL MEDICINE | Facility: CLINIC | Age: 35
End: 2025-01-30
Payer: COMMERCIAL

## 2025-01-30 VITALS
SYSTOLIC BLOOD PRESSURE: 126 MMHG | OXYGEN SATURATION: 100 % | RESPIRATION RATE: 20 BRPM | TEMPERATURE: 98 F | HEART RATE: 90 BPM | WEIGHT: 178 LBS | DIASTOLIC BLOOD PRESSURE: 88 MMHG | HEIGHT: 65 IN | BODY MASS INDEX: 29.66 KG/M2

## 2025-01-30 DIAGNOSIS — E66.3 OVERWEIGHT (BMI 25.0-29.9): Primary | ICD-10-CM

## 2025-01-30 NOTE — PROGRESS NOTES
Chief Complaint / Reason:      Chief Complaint   Patient presents with    Obesity     Follow up       Subjective     History of Present Illness  The patient is a 34-year-old female presenting for a follow-up on obesity.    She has experienced slight weight loss since her last visit. She reports difficulty maintaining a high-protein diet due to financial constraints and an aversion to cottage cheese since starting Adipex. She is concerned about rapid weight gain if she discontinues Adipex, a phenomenon she experienced previously. She reports no chest pain, shortness of breath, or heart palpitations and no side effects from Adipex. Her daily fluid intake includes one cup of coffee and sparkling water, and she avoids aspartame due to headaches.    FAMILY HISTORY  No family history of thyroid cancer.    MEDICATIONS  Current: Adipex    History taken from: patient    PMH/FH/Social History were reviewed and updated appropriately in the electronic medical record.   Past Medical History:   Diagnosis Date    Finger fracture     GERD (gastroesophageal reflux disease)     Kidney infection     Urinary tract infection      Past Surgical History:   Procedure Laterality Date    DENTAL PROCEDURE      TONSILLECTOMY      WISDOM TOOTH EXTRACTION       Social History     Socioeconomic History    Marital status:    Tobacco Use    Smoking status: Never    Smokeless tobacco: Never   Vaping Use    Vaping status: Never Used   Substance and Sexual Activity    Alcohol use: Not Currently     Comment: rarely     Drug use: No    Sexual activity: Yes     Partners: Male     Birth control/protection: Nexplanon     Family History   Problem Relation Age of Onset    Arthritis Mother     Arthritis Father     Arthritis Maternal Grandmother        Review of Systems:   Review of Systems      All other systems were reviewed and are negative.  Exceptions are noted in the subjective or above.      Objective     Vital Signs  Vitals:    01/30/25 1735    BP: 126/88   Pulse: 90   Resp: 20   Temp: 98 °F (36.7 °C)   SpO2: 100%       Body mass index is 29.62 kg/m².  BMI is >= 25 and <30. (Overweight) The following options were offered after discussion;: exercise counseling/recommendations and nutrition counseling/recommendations       Physical Exam  Vitals and nursing note reviewed.   Constitutional:       General: She is not in acute distress.     Appearance: She is well-developed. She is obese.   Cardiovascular:      Rate and Rhythm: Normal rate and regular rhythm.      Pulses: Normal pulses.      Heart sounds: Normal heart sounds.   Pulmonary:      Effort: Pulmonary effort is normal.      Breath sounds: Normal breath sounds. No wheezing.   Chest:      Chest wall: No tenderness.   Skin:     General: Skin is warm and dry.      Capillary Refill: Capillary refill takes less than 2 seconds.      Coloration: Skin is not pale.      Findings: No erythema or rash.   Neurological:      Mental Status: She is alert and oriented to person, place, and time.   Psychiatric:         Behavior: Behavior normal.         Thought Content: Thought content normal.         Judgment: Judgment normal.              Results Review:    I reviewed the patient's new clinical results.       Medication Review:     Current Outpatient Medications:     cyanocobalamin 1000 MCG/ML injection, Inject 1 ml subcutaneously every 28 days x 6 doses, Disp: 10 mL, Rfl: 0    docusate sodium (Colace) 100 MG capsule, Take 1 capsule by mouth 2 (Two) Times a Day., Disp: 60 capsule, Rfl: 1    fluticasone (FLONASE) 50 MCG/ACT nasal spray, USE 2 SPRAYS TO EACH NOSTRIL EVERY DAY, Disp: 16 g, Rfl: 1    hydroCHLOROthiazide 12.5 MG tablet, Take 1 tablet by mouth Daily., Disp: 90 tablet, Rfl: 3    Nexplanon 68 MG implant subdermal implant, , Disp: , Rfl:     phenazopyridine (Pyridium) 100 MG tablet, Take 1 tablet by mouth 3 (Three) Times a Day As Needed for Bladder Spasms or Dysuria., Disp: 6 tablet, Rfl: 0    phentermine  (ADIPEX-P) 37.5 MG tablet, TAKE 1 TABLET BY MOUTH EVERY MORNING BEFORE BREAKFAST, Disp: 30 tablet, Rfl: 0    nitrofurantoin, macrocrystal-monohydrate, (Macrobid) 100 MG capsule, Take 1 capsule by mouth 2 (Two) Times a Day. (Patient not taking: Reported on 1/30/2025), Disp: 6 capsule, Rfl: 0    norethindrone-ethinyl estradiol FE (Junel FE 1/20) 1-20 MG-MCG per tablet, Take 1 tablet by mouth Daily. (Patient not taking: Reported on 1/30/2025), Disp: 28 tablet, Rfl: 1    Diagnoses and all orders for this visit:    Overweight (BMI 25.0-29.9)      Assessment & Plan  1. Obesity  - Lost a few pounds since last visit  - Blood pressure and vital signs are stable  - Advised to incorporate cottage cheese into diet, potentially in smoothies, and use protein powder  - Encouraged to maintain hydration and continue Adipex  - Discussed potential side effects of Topamax, including memory impairment  - Reassured about prescription refills  - Advised to explore recipes for homemade protein bars    Follow-up  - Follow-up in 4 weeks    No follow-ups on file.    JULIO C Danielson  01/30/2025    Patient or patient representative verbalized consent for the use of Ambient Listening during the visit with  JULIO C Danielson for chart documentation.

## 2025-02-23 DIAGNOSIS — E66.3 OVERWEIGHT WITH BODY MASS INDEX (BMI) OF 29 TO 29.9 IN ADULT: ICD-10-CM

## 2025-02-24 RX ORDER — PHENTERMINE HYDROCHLORIDE 37.5 MG/1
37.5 TABLET ORAL
Qty: 30 TABLET | Refills: 0 | Status: SHIPPED | OUTPATIENT
Start: 2025-02-24

## 2025-02-25 ENCOUNTER — PRIOR AUTHORIZATION (OUTPATIENT)
Dept: INTERNAL MEDICINE | Facility: CLINIC | Age: 35
End: 2025-02-25
Payer: COMMERCIAL

## 2025-02-25 NOTE — TELEPHONE ENCOUNTER
Lindsay Francis (Key: [id number])  PA Case ID #: [id number]  Rx #: [id number]  Need Help? Call us at [phone]  Outcome  Approved today by [employer] 2017  CaseId:[id number];Status:Approved;Review Type:Prior Auth;Coverage Start Date:01/26/2025;Coverage End Date:05/26/2025;  Effective Date: 1/25/2025  Authorization Expiration Date: 5/25/2025  Drug  Phentermine HCl 37.5MG tablets  ePA cloud logo  Form  [employer] Electronic PA Form (2017 NCP[employer])  Original Claim Info  75 CALL HELP DESK

## 2025-02-25 NOTE — TELEPHONE ENCOUNTER
Lindsay Francis (Key: [id number])  PA Case ID #: [id number]  Rx #: [id number]  Need Help? Call us at [phone]  Status  sent iconSent to Plan today  Drug  Phentermine HCl 37.5MG tablets  ePA cloud logo  Form  [employer] Electronic PA Form (2017 NCPDP)  Original Claim Info  75 CALL HELP DESK

## 2025-03-14 ENCOUNTER — RESULTS FOLLOW-UP (OUTPATIENT)
Dept: INTERNAL MEDICINE | Facility: CLINIC | Age: 35
End: 2025-03-14

## 2025-03-14 ENCOUNTER — OFFICE VISIT (OUTPATIENT)
Dept: INTERNAL MEDICINE | Facility: CLINIC | Age: 35
End: 2025-03-14
Payer: COMMERCIAL

## 2025-03-14 VITALS
SYSTOLIC BLOOD PRESSURE: 130 MMHG | OXYGEN SATURATION: 98 % | BODY MASS INDEX: 29.82 KG/M2 | WEIGHT: 179 LBS | DIASTOLIC BLOOD PRESSURE: 76 MMHG | TEMPERATURE: 99.1 F | HEIGHT: 65 IN | HEART RATE: 118 BPM

## 2025-03-14 DIAGNOSIS — E66.3 OVERWEIGHT WITH BODY MASS INDEX (BMI) OF 29 TO 29.9 IN ADULT: Primary | ICD-10-CM

## 2025-03-14 DIAGNOSIS — M54.50 LOW BACK PAIN, UNSPECIFIED BACK PAIN LATERALITY, UNSPECIFIED CHRONICITY, UNSPECIFIED WHETHER SCIATICA PRESENT: ICD-10-CM

## 2025-03-14 DIAGNOSIS — M65.4 TENDINITIS, DE QUERVAIN'S: ICD-10-CM

## 2025-03-14 DIAGNOSIS — R05.8 POST-VIRAL COUGH SYNDROME: ICD-10-CM

## 2025-03-14 LAB
BILIRUB BLD-MCNC: NEGATIVE MG/DL
CLARITY, POC: CLEAR
COLOR UR: YELLOW
EXPIRATION DATE: NORMAL
GLUCOSE UR STRIP-MCNC: NEGATIVE MG/DL
KETONES UR QL: NEGATIVE
LEUKOCYTE EST, POC: NEGATIVE
Lab: NORMAL
NITRITE UR-MCNC: NEGATIVE MG/ML
PH UR: 6 [PH] (ref 5–8)
PROT UR STRIP-MCNC: NEGATIVE MG/DL
RBC # UR STRIP: NEGATIVE /UL
SP GR UR: 1.01 (ref 1–1.03)
UROBILINOGEN UR QL: NORMAL

## 2025-03-14 PROCEDURE — 81003 URINALYSIS AUTO W/O SCOPE: CPT | Performed by: NURSE PRACTITIONER

## 2025-03-14 PROCEDURE — 99214 OFFICE O/P EST MOD 30 MIN: CPT | Performed by: NURSE PRACTITIONER

## 2025-03-14 RX ORDER — PHENTERMINE HYDROCHLORIDE 37.5 MG/1
37.5 TABLET ORAL
Qty: 30 TABLET | Refills: 0 | Status: SHIPPED | OUTPATIENT
Start: 2025-03-14

## 2025-03-14 RX ORDER — DEXTROMETHORPHAN HYDROBROMIDE AND PROMETHAZINE HYDROCHLORIDE 15; 6.25 MG/5ML; MG/5ML
5 SYRUP ORAL NIGHTLY PRN
Qty: 118 ML | Refills: 0 | Status: SHIPPED | OUTPATIENT
Start: 2025-03-14

## 2025-03-14 NOTE — PROGRESS NOTES
Chief Complaint / Reason:      Chief Complaint   Patient presents with    Weight Check       Subjective     History of Present Illness  35-year-old female presents for weight check, elevated heart rate, low-grade fever, persistent cough, and left wrist pain.    Gained 2 lbs (177 to 179). Discontinued phentermine for 3 weeks due to influenza on 02/19/2025, resumed this week.    Experiencing back pain today, no constipation or other symptoms. Currently menstruating.    Persistent non-productive cough, unrelieved by Tessalon Perles and Mucinex DM. Diagnosed with post-viral bronchitis.    Severe left wrist pain extending downwards, right-hand dominant, significant shaking, no numbness upon waking, pain prevents carrying groceries.    Missed blood pressure medication today.    MEDICATIONS  Current: Phentermine, Tessalon Perles, Mucinex DM    History taken from: patient    PMH/FH/Social History were reviewed and updated appropriately in the electronic medical record.   Past Medical History:   Diagnosis Date    Finger fracture     GERD (gastroesophageal reflux disease)     Kidney infection     Urinary tract infection      Past Surgical History:   Procedure Laterality Date    DENTAL PROCEDURE      TONSILLECTOMY      WISDOM TOOTH EXTRACTION       Social History     Socioeconomic History    Marital status:    Tobacco Use    Smoking status: Never    Smokeless tobacco: Never   Vaping Use    Vaping status: Never Used   Substance and Sexual Activity    Alcohol use: Not Currently     Comment: rarely     Drug use: No    Sexual activity: Yes     Partners: Male     Birth control/protection: Nexplanon     Family History   Problem Relation Age of Onset    Arthritis Mother     Arthritis Father     Arthritis Maternal Grandmother        Review of Systems:   Review of Systems      All other systems were reviewed and are negative.  Exceptions are noted in the subjective or above.      Objective     Vital Signs  Vitals:    03/14/25  1536   BP: 130/76   Pulse: 118   Temp: 99.1 °F (37.3 °C)   SpO2: 98%       Body mass index is 29.79 kg/m².  BMI is >= 25 and <30. (Overweight) The following options were offered after discussion;: exercise counseling/recommendations and nutrition counseling/recommendations       Physical Exam  Vitals and nursing note reviewed.   Constitutional:       General: She is not in acute distress.     Appearance: She is well-developed and overweight.   Cardiovascular:      Rate and Rhythm: Normal rate and regular rhythm.      Pulses: Normal pulses.      Heart sounds: Normal heart sounds.   Pulmonary:      Effort: Pulmonary effort is normal.      Breath sounds: Normal breath sounds. No wheezing.   Chest:      Chest wall: No tenderness.   Musculoskeletal:         General: Tenderness and deformity present.      Left wrist: Bony tenderness and snuff box tenderness present. Decreased range of motion.   Skin:     General: Skin is warm and dry.      Capillary Refill: Capillary refill takes less than 2 seconds.      Coloration: Skin is not pale.      Findings: No erythema or rash.   Neurological:      Mental Status: She is alert and oriented to person, place, and time.   Psychiatric:         Behavior: Behavior normal.         Thought Content: Thought content normal.         Judgment: Judgment normal.              Results Review:    I reviewed the patient's new clinical results.   Office Visit on 03/14/2025   Component Date Value Ref Range Status    Color 03/14/2025 Yellow  Yellow, Straw, Dark Yellow, [place] Final    Clarity, UA 03/14/2025 Clear  Clear Final    Specific Gravity  03/14/2025 1.015  1.005 - 1.030 Final    pH, Urine 03/14/2025 6.0  5.0 - 8.0 Final    Leukocytes 03/14/2025 Negative  Negative Final    Nitrite, UA 03/14/2025 Negative  Negative Final    Protein, POC 03/14/2025 Negative  Negative mg/dL Final    Glucose, UA 03/14/2025 Negative  Negative mg/dL Final    Ketones, UA 03/14/2025 Negative  Negative Final     Urobilinogen, UA 03/14/2025 Normal  Normal, 0.2 E.U./dL Final    Bilirubin 03/14/2025 Negative  Negative Final    Blood, UA 03/14/2025 Negative  Negative Final    Lot Number 03/14/2025 [id number]   Final    Expiration Date 03/14/2025 41,026   Final         Medication Review:     Current Outpatient Medications:     albuterol sulfate  (90 Base) MCG/ACT inhaler, Inhale 2 puffs Every 4 (Four) Hours As Needed for Wheezing or Shortness of Air., Disp: 18 g, Rfl: 0    cyanocobalamin 1000 MCG/ML injection, Inject 1 ml subcutaneously every 28 days x 6 doses, Disp: 10 mL, Rfl: 0    docusate sodium (Colace) 100 MG capsule, Take 1 capsule by mouth 2 (Two) Times a Day., Disp: 60 capsule, Rfl: 1    hydroCHLOROthiazide 12.5 MG tablet, Take 1 tablet by mouth Daily., Disp: 90 tablet, Rfl: 3    Nexplanon 68 MG implant subdermal implant, , Disp: , Rfl:     ondansetron ODT (ZOFRAN-ODT) 4 MG disintegrating tablet, Place 1 tablet on the tongue Every 8 (Eight) Hours As Needed for Nausea or Vomiting., Disp: 9 tablet, Rfl: 0    phentermine (ADIPEX-P) 37.5 MG tablet, Take 1 tablet by mouth Every Morning Before Breakfast., Disp: 30 tablet, Rfl: 0    benzonatate (TESSALON) 200 MG capsule, Take 1 capsule by mouth 3 (Three) Times a Day As Needed for Cough. (Patient not taking: Reported on 3/14/2025), Disp: 21 capsule, Rfl: 0    doxycycline (VIBRAMYCIN) 100 MG capsule, Take 1 capsule by mouth 2 (Two) Times a Day. (Patient not taking: Reported on 3/14/2025), Disp: 14 capsule, Rfl: 0    fluticasone (FLONASE) 50 MCG/ACT nasal spray, USE 2 SPRAYS TO EACH NOSTRIL EVERY DAY (Patient not taking: Reported on 3/14/2025), Disp: 16 g, Rfl: 1    oseltamivir (TAMIFLU) 75 MG capsule, Take 1 capsule by mouth Every 12 (Twelve) Hours. (Patient not taking: Reported on 3/14/2025), Disp: 10 capsule, Rfl: 0    predniSONE (DELTASONE) 20 MG tablet, Take 1 tablet by mouth 2 (Two) Times a Day. (Patient not taking: Reported on 3/14/2025), Disp: 10 tablet, Rfl:  0    promethazine-dextromethorphan (PROMETHAZINE-DM) 6.25-15 MG/5ML syrup, Take 5 mL by mouth At Night As Needed for Cough., Disp: 118 mL, Rfl: 0    Diagnoses and all orders for this visit:    Overweight with body mass index (BMI) of 29 to 29.9 in adult  -     phentermine (ADIPEX-P) 37.5 MG tablet; Take 1 tablet by mouth Every Morning Before Breakfast.    Low back pain, unspecified back pain laterality, unspecified chronicity, unspecified whether sciatica present  -     POCT urinalysis dipstick, automated    Tendinitis, de Quervain's    Post-viral cough syndrome  -     promethazine-dextromethorphan (PROMETHAZINE-DM) 6.25-15 MG/5ML syrup; Take 5 mL by mouth At Night As Needed for Cough.      Assessment & Plan  1. Weight gain  - Gained 2 lbs (177 to 179)  - Resumed phentermine  - Refill prescription sent to [facility]    2. Elevated heart rate  - Likely due to missed blood pressure medication  - Advise monitoring heart rate and regular medication intake    3. Low-grade fever  - 99.1°F, possibly related to UTI or menstrual cycle  - Urine test for UTI  - Prescribe antibiotics if positive    4. Persistent cough  - Likely post-viral bronchitis  - Prescribe Phenergan DM for nighttime use  - Advise elevating head of bed    5. Left wrist pain  - Likely inflammation  - Advise cock-up wrist splint during sleep  - Gentle stretching exercises  - Consider orthopedic referral if pain persists    Return in about 4 weeks (around 4/11/2025), or if symptoms worsen or fail to improve.    JULIO C Danielson  03/14/2025    Patient or patient representative verbalized consent for the use of Ambient Listening during the visit with  JULIO C Danielson for chart documentation.

## 2025-03-20 DIAGNOSIS — R03.0 ELEVATED BLOOD PRESSURE READING: ICD-10-CM

## 2025-03-20 DIAGNOSIS — M79.89 SWELLING OF BOTH LOWER EXTREMITIES: ICD-10-CM

## 2025-03-20 RX ORDER — HYDROCHLOROTHIAZIDE 12.5 MG/1
12.5 TABLET ORAL DAILY
Qty: 90 TABLET | Refills: 3 | Status: SHIPPED | OUTPATIENT
Start: 2025-03-20

## 2025-06-12 ENCOUNTER — OFFICE VISIT (OUTPATIENT)
Dept: INTERNAL MEDICINE | Facility: CLINIC | Age: 35
End: 2025-06-12
Payer: COMMERCIAL

## 2025-06-12 VITALS
WEIGHT: 194 LBS | BODY MASS INDEX: 32.32 KG/M2 | TEMPERATURE: 97.8 F | SYSTOLIC BLOOD PRESSURE: 112 MMHG | HEIGHT: 65 IN | DIASTOLIC BLOOD PRESSURE: 74 MMHG | OXYGEN SATURATION: 100 % | HEART RATE: 94 BPM

## 2025-06-12 DIAGNOSIS — E55.9 VITAMIN D INSUFFICIENCY: ICD-10-CM

## 2025-06-12 DIAGNOSIS — M25.532 LEFT WRIST PAIN: ICD-10-CM

## 2025-06-12 DIAGNOSIS — E66.811 CLASS 1 OBESITY DUE TO EXCESS CALORIES WITH SERIOUS COMORBIDITY AND BODY MASS INDEX (BMI) OF 32.0 TO 32.9 IN ADULT: ICD-10-CM

## 2025-06-12 DIAGNOSIS — Z13.29 SCREENING FOR ENDOCRINE, NUTRITIONAL, METABOLIC AND IMMUNITY DISORDER: ICD-10-CM

## 2025-06-12 DIAGNOSIS — Z00.00 PHYSICAL EXAM, ANNUAL: Primary | ICD-10-CM

## 2025-06-12 DIAGNOSIS — R20.2 NUMBNESS AND TINGLING IN LEFT HAND: ICD-10-CM

## 2025-06-12 DIAGNOSIS — Z13.228 SCREENING FOR ENDOCRINE, NUTRITIONAL, METABOLIC AND IMMUNITY DISORDER: ICD-10-CM

## 2025-06-12 DIAGNOSIS — Z13.21 SCREENING FOR ENDOCRINE, NUTRITIONAL, METABOLIC AND IMMUNITY DISORDER: ICD-10-CM

## 2025-06-12 DIAGNOSIS — R20.0 NUMBNESS AND TINGLING IN LEFT HAND: ICD-10-CM

## 2025-06-12 DIAGNOSIS — E66.09 CLASS 1 OBESITY DUE TO EXCESS CALORIES WITH SERIOUS COMORBIDITY AND BODY MASS INDEX (BMI) OF 32.0 TO 32.9 IN ADULT: ICD-10-CM

## 2025-06-12 DIAGNOSIS — Z13.0 SCREENING FOR ENDOCRINE, NUTRITIONAL, METABOLIC AND IMMUNITY DISORDER: ICD-10-CM

## 2025-06-12 DIAGNOSIS — E53.8 B12 DEFICIENCY: ICD-10-CM

## 2025-06-12 PROCEDURE — 99395 PREV VISIT EST AGE 18-39: CPT | Performed by: NURSE PRACTITIONER

## 2025-06-12 NOTE — PROGRESS NOTES
Chief Complaint   Patient presents with    Annual Exam     numbness    Numbness     Numbness began in fingers, travels up arm, since 3 weeks ago       Lindsay Francis is a 35 y.o. female and is here for a comprehensive physical exam.   History of Present Illness  The patient presents for an annual physical and to discuss numbness that begins in her fingers and travels up her arms. This has been going on for about 3 weeks now.    She reports experiencing numbness and tingling sensations in her left hand, which she believes may be originating from her neck. The symptoms are more pronounced on the side she typically sleeps on and persist throughout the day and night. The numbness initially presented in two fingers but has since spread to the entire tip of one finger, which has been consistently numb for the past three weeks. She describes the sensation as similar to when her foot falls asleep and begins to tingle upon sitting down. She also reports difficulty in carrying groceries due to the pain in her wrist. She is right-handed and has previously taken muscle relaxers, although she does not recall if they were specifically for this issue. She has sought medical attention for this issue and was advised to perform exercises, which have not provided relief. She has also tried using a brace, but it did not alleviate the symptoms.    She has discontinued the use of Adipex due to financial constraints. Her BMI has fluctuated between 28 and 29. She has previously tried naltrexone without success.    She has been self-administering B12 injections every three months and is not currently due for another injection. She reports no known exposure to hepatitis. She is up-to-date with her dental check-ups, having had one late last year. She has only seen a dermatologist once for a mole. She engages in regular exercise, including daily walks up and down the halls at work. She had a Pap smear over a year ago when she received  her Nexplanon implant. She has experienced irregular bleeding and irritation with the Nexplanon, which has led to monthly requests for medication. Her periods typically last 6 to 8 days, and she has not had a period in 2025. She is considering discontinuing her blood pressure medication due to facial redness, which she believes is a side effect. She monitors her blood pressure at home and reports no chest pain or shortness of breath. She reports poor sleep quality, which she attributes to work-related stress. She has been experiencing eye twitching for the past week. She is due for a vision check-up.    GYNECOLOGICAL HISTORY:  Duration: 6 to 8 days    FAMILY HISTORY  Her grandmother recently found out she has hemochromatosis.        History:  LMP: No LMP recorded. Patient has had an implant.  Last pap date: 20  Abnormal pap? no  : 1  Para: 1  STD:none  Age of menarche:15  Sexually active:17  Abuse:none      Do you take any herbs or supplements that were not prescribed by a doctor? no  Are you taking calcium supplements? no  Are you taking aspirin daily? no      Health Habits:  Dental Exam. up to date  Eye Exam. not up to date - advised patient to schedule  Dermatology.not up to date - advised patient to schedule or closely monitor for changes in skin lesions  Exercise: 4 times/week.  Current exercise activities include: walking    Health Maintenance   Topic Date Due    ANNUAL PHYSICAL  2025    COVID-19 Vaccine (3 - 2024- season) 2025 (Originally 2024)    HEPATITIS C SCREENING  2026 (Originally 2016)    INFLUENZA VACCINE  2025    PAP SMEAR  10/05/2026    TDAP/TD VACCINES (2 - Td or Tdap) 2031    Pneumococcal Vaccine 0-49  Aged Out       PMH, PSH, SocHx, FamHx, Allergies, and Medications: Reviewed and updated in the Visit Navigator.     Allergies   Allergen Reactions    Ceftin [Cefuroxime] Nausea Only    Amoxil [Amoxicillin] Rash     yeast     Past  "Medical History:   Diagnosis Date    Finger fracture     GERD (gastroesophageal reflux disease)     Kidney infection     Urinary tract infection      Past Surgical History:   Procedure Laterality Date    DENTAL PROCEDURE      TONSILLECTOMY      WISDOM TOOTH EXTRACTION       Social History     Socioeconomic History    Marital status:    Tobacco Use    Smoking status: Never     Passive exposure: Never    Smokeless tobacco: Never   Vaping Use    Vaping status: Never Used   Substance and Sexual Activity    Alcohol use: Not Currently     Comment: rarely     Drug use: No    Sexual activity: Yes     Partners: Male     Birth control/protection: Nexplanon     Family History   Problem Relation Age of Onset    Arthritis Mother     Arthritis Father     Arthritis Maternal Grandmother        Review of Systems  Review of Systems      Vitals:    06/12/25 1434   BP: 112/74   Pulse: 94   Temp: 97.8 °F (36.6 °C)   SpO2: 100%       Objective   /74   Pulse 94   Temp 97.8 °F (36.6 °C)   Ht 165.1 cm (65\")   Wt 88 kg (194 lb)   SpO2 100%   BMI 32.28 kg/m²           Physical Exam         Assessment & Plan   1. Healthy female exam.    2. Patient Counseling: Including but not Limited to the following, when appropriate:  --Nutrition: Stressed importance of moderation in sodium/caffeine intake, saturated fat and cholesterol, caloric balance, sufficient intake of fresh fruits, vegetables, fiber, calcium, iron, and 1 mg of folate supplement per day (for females capable of pregnancy).  --Discussed the issue of estrogen replacement, calcium supplement, and the daily use of baby aspirin.  --Exercise: Stressed the importance of regular exercise.   --Substance Abuse: Discussed cessation/primary prevention of tobacco, alcohol, or other drug use; driving or other dangerous activities under the influence; availability of treatment for abuse, as indicated based on social history.    --Sexuality: Discussed sexually transmitted diseases, " partner selection, use of condoms, avoidance of unintended pregnancy  and contraceptive alternatives.   --Injury prevention: Discussed safety belts, safety helmets, smoke detector, smoking near bedding or upholstery.   --Dental health: Discussed importance of regular tooth brushing, flossing, and dental visits.  --Immunizations reviewed.  --Discussed benefits of colon cancer screening.      3. Discussed the patient's BMI with her.  The BMI is above average; BMI management plan is completed  4. Return if symptoms worsen or fail to improve.  5. Age-appropriate Screening Scheduled      Assessment & Plan   Assessment & Plan  1. Numbness and tingling in the left hand.  - Symptoms may be indicative of tendinitis or could potentially be originating from the neck.  - Increased pain and limited mobility noted.  - Referral to Dr. Crockett will be made for further evaluation.  - Physical therapy will also be considered as part of the treatment plan.    2. Obesity.  - BMI is 30, class I obesity.  - Discussed Contrave, a combination of bupropion and naltrexone, as well as Wellbutrin and naltrexone separately for weight management.  - Patient will consider the use of naltrexone and Wellbutrin for weight management and will inform us of her decision.  - Blood work will be ordered to assess thyroid function, B12 levels, vitamin D levels, and other basic parameters.    3. Health maintenance.  - Advised to abstain from taking B12 injection around the time of blood draw to avoid skewed results.  - If vitamin D levels are low, a regimen of 12 tablets per week for 3 months will be initiated, followed by a daily over-the-counter supplement for a year.  - During winter months, the dosage will be increased to 4000 units.  - Recheck of vitamin D levels will be scheduled in a year.    Diagnoses and all orders for this visit:    1. Physical exam, annual (Primary)  -     Comprehensive Metabolic Panel  -     Lipid Panel  -     CBC &  Differential    2. Vitamin D insufficiency  -     Vitamin D,25-Hydroxy    3. B12 deficiency  -     Vitamin B12    4. Class 1 obesity due to excess calories with serious comorbidity and body mass index (BMI) of 32.0 to 32.9 in adult    5. Screening for endocrine, nutritional, metabolic and immunity disorder  -     Hemoglobin A1c  -     Vitamin B12  -     TSH  -     T4, Free    6. Left wrist pain  -     Ambulatory Referral to Orthopedic Surgery    7. Numbness and tingling in left hand  -     Ambulatory Referral to Orthopedic Surgery             Patient or patient representative verbalized consent for the use of Ambient Listening during the visit with  JULIO C Danielson for chart documentation.       JULIO C Danielson 06/12/2025

## 2025-06-28 ENCOUNTER — TELEPHONE (OUTPATIENT)
Dept: INTERNAL MEDICINE | Facility: CLINIC | Age: 35
End: 2025-06-28
Payer: COMMERCIAL

## 2025-07-09 RX ORDER — ERGOCALCIFEROL 1.25 MG/1
50000 CAPSULE, LIQUID FILLED ORAL WEEKLY
Qty: 12 CAPSULE | Refills: 0 | OUTPATIENT
Start: 2025-07-09

## 2025-07-29 RX ORDER — BUPROPION HYDROCHLORIDE 150 MG/1
150 TABLET ORAL DAILY
Qty: 90 TABLET | Refills: 0 | Status: SHIPPED | OUTPATIENT
Start: 2025-07-29

## 2025-07-29 RX ORDER — NALTREXONE HYDROCHLORIDE 50 MG/1
50 TABLET, FILM COATED ORAL DAILY
Qty: 90 TABLET | Refills: 0 | Status: SHIPPED | OUTPATIENT
Start: 2025-07-29